# Patient Record
Sex: MALE | Race: WHITE | NOT HISPANIC OR LATINO
[De-identification: names, ages, dates, MRNs, and addresses within clinical notes are randomized per-mention and may not be internally consistent; named-entity substitution may affect disease eponyms.]

---

## 2020-08-11 ENCOUNTER — TRANSCRIBE ORDERS (OUTPATIENT)
Dept: SCHEDULING | Age: 80
End: 2020-08-11

## 2020-08-11 DIAGNOSIS — R97.0 ELEVATED CARCINOEMBRYONIC ANTIGEN (CEA): Primary | ICD-10-CM

## 2020-08-11 DIAGNOSIS — N40.1 BENIGN PROSTATIC HYPERPLASIA WITH LOWER URINARY TRACT SYMPTOMS: ICD-10-CM

## 2020-08-18 ENCOUNTER — HOSPITAL ENCOUNTER (OUTPATIENT)
Dept: RADIOLOGY | Age: 80
Discharge: HOME | End: 2020-08-18
Attending: UROLOGY
Payer: COMMERCIAL

## 2020-08-18 DIAGNOSIS — R97.0 ELEVATED CARCINOEMBRYONIC ANTIGEN (CEA): ICD-10-CM

## 2020-08-18 DIAGNOSIS — N40.1 BENIGN PROSTATIC HYPERPLASIA WITH LOWER URINARY TRACT SYMPTOMS: ICD-10-CM

## 2020-08-18 RX ORDER — GADOBUTROL 604.72 MG/ML
9.5 INJECTION INTRAVENOUS
Status: COMPLETED | OUTPATIENT
Start: 2020-08-18 | End: 2020-08-18

## 2020-08-18 RX ADMIN — GADOBUTROL 9.5 MMOL: 604.72 INJECTION INTRAVENOUS at 13:08

## 2021-11-15 ENCOUNTER — OFFICE VISIT (OUTPATIENT)
Dept: CARDIOLOGY CLINIC | Facility: CLINIC | Age: 81
End: 2021-11-15
Payer: MEDICARE

## 2021-11-15 VITALS
OXYGEN SATURATION: 96 % | BODY MASS INDEX: 29.66 KG/M2 | SYSTOLIC BLOOD PRESSURE: 142 MMHG | HEART RATE: 76 BPM | HEIGHT: 72 IN | WEIGHT: 219 LBS | DIASTOLIC BLOOD PRESSURE: 72 MMHG | RESPIRATION RATE: 16 BRPM

## 2021-11-15 DIAGNOSIS — I25.10 CORONARY ARTERY DISEASE INVOLVING NATIVE HEART WITHOUT ANGINA PECTORIS, UNSPECIFIED VESSEL OR LESION TYPE: Primary | ICD-10-CM

## 2021-11-15 PROCEDURE — 93000 ELECTROCARDIOGRAM COMPLETE: CPT | Performed by: INTERNAL MEDICINE

## 2021-11-15 PROCEDURE — 99204 OFFICE O/P NEW MOD 45 MIN: CPT | Performed by: INTERNAL MEDICINE

## 2021-11-15 RX ORDER — ALBUTEROL SULFATE 90 UG/1
AEROSOL, METERED RESPIRATORY (INHALATION)
COMMUNITY
Start: 2021-10-28

## 2021-11-15 RX ORDER — MONTELUKAST SODIUM 10 MG/1
10 TABLET ORAL DAILY
COMMUNITY
Start: 2021-10-26

## 2021-11-15 RX ORDER — VITAMIN E (DL,TOCOPHERYL ACET) 400 UNIT
TABLET,CHEWABLE ORAL
COMMUNITY

## 2021-11-15 RX ORDER — FEXOFENADINE HYDROCHLORIDE 60 MG/1
60 TABLET, FILM COATED ORAL AS NEEDED
COMMUNITY

## 2021-11-15 RX ORDER — RAMIPRIL 10 MG/1
10 CAPSULE ORAL DAILY
COMMUNITY
Start: 2021-10-26

## 2021-11-15 RX ORDER — OMEPRAZOLE MAGNESIUM 10 MG/1
20 GRANULE, DELAYED RELEASE ORAL DAILY
COMMUNITY

## 2021-11-15 RX ORDER — ROSUVASTATIN CALCIUM 40 MG/1
20 TABLET, COATED ORAL DAILY
COMMUNITY

## 2021-11-15 RX ORDER — ALBUTEROL SULFATE 90 UG/1
1 AEROSOL, METERED RESPIRATORY (INHALATION)
COMMUNITY
Start: 2021-05-26

## 2021-11-15 RX ORDER — ASPIRIN 81 MG/1
TABLET ORAL
COMMUNITY

## 2021-11-15 RX ORDER — PREDNISONE 20 MG/1
TABLET ORAL
COMMUNITY
Start: 2021-05-26

## 2021-11-15 RX ORDER — CHLORAL HYDRATE 500 MG
1000 CAPSULE ORAL DAILY
COMMUNITY

## 2021-11-15 RX ORDER — GABAPENTIN 300 MG/1
300 CAPSULE ORAL AS NEEDED
COMMUNITY
Start: 2021-10-18

## 2023-11-22 ENCOUNTER — OFFICE VISIT (OUTPATIENT)
Dept: CARDIOLOGY CLINIC | Facility: CLINIC | Age: 83
End: 2023-11-22
Payer: MEDICARE

## 2023-11-22 VITALS
BODY MASS INDEX: 29.12 KG/M2 | HEIGHT: 72 IN | SYSTOLIC BLOOD PRESSURE: 150 MMHG | WEIGHT: 215 LBS | HEART RATE: 72 BPM | DIASTOLIC BLOOD PRESSURE: 76 MMHG | OXYGEN SATURATION: 97 % | RESPIRATION RATE: 16 BRPM

## 2023-11-22 DIAGNOSIS — I25.10 CORONARY ARTERY DISEASE INVOLVING NATIVE CORONARY ARTERY OF NATIVE HEART WITHOUT ANGINA PECTORIS: Primary | ICD-10-CM

## 2023-11-22 DIAGNOSIS — I65.21 STENOSIS OF RIGHT CAROTID ARTERY: ICD-10-CM

## 2023-11-22 PROCEDURE — 99213 OFFICE O/P EST LOW 20 MIN: CPT | Performed by: INTERNAL MEDICINE

## 2023-11-22 RX ORDER — GABAPENTIN 100 MG/1
CAPSULE ORAL
COMMUNITY
Start: 2023-10-25

## 2023-11-22 RX ORDER — CELECOXIB 200 MG/1
200 CAPSULE ORAL DAILY
COMMUNITY
Start: 2023-06-06

## 2023-11-22 RX ORDER — FLUTICASONE PROPIONATE AND SALMETEROL XINAFOATE 230; 21 UG/1; UG/1
AEROSOL, METERED RESPIRATORY (INHALATION)
COMMUNITY
Start: 2023-10-25

## 2023-11-22 RX ORDER — FAMOTIDINE 20 MG/1
1 TABLET, FILM COATED ORAL EVERY MORNING
COMMUNITY
Start: 2023-09-13

## 2023-11-22 NOTE — PROGRESS NOTES
Cardiology Follow Up    Anuradha Rudd  1940  [id number]  [facility] [address]  [place] [id number] [phone]    1. Coronary artery disease involving native coronary artery of native heart without angina pectoris    2. Stenosis of right carotid artery          Interval History: 55-year-old retired  who has known carotid plaque with maximal stenosis of 50 to 69% in the right carotid artery and less than 50% in the left carotid artery. He is physically active and has not had exertional related discomfort. He has had no symptoms related to the central nervous system. He does not smoke cigarettes. He is on Crestor 40 mg daily and has an LDL cholesterol 53. He is able to walk 2 to 3 miles. Patient Active Problem List   Diagnosis    Coronary artery disease involving native coronary artery of native heart without angina pectoris    Stenosis of right carotid artery     History reviewed. No pertinent past medical history.   Social History     Socioeconomic History    Marital status: /Civil Union     Spouse name: Not on file    Number of children: Not on file    Years of education: Not on file    Highest education level: Not on file   Occupational History    Not on file   Tobacco Use    Smoking status: Never    Smokeless tobacco: Never   Vaping Use    Vaping Use: Never used   Substance and Sexual Activity    Alcohol use: Not Currently    Drug use: Never    Sexual activity: Not on file   Other Topics Concern    Not on file   Social History Narrative    Not on file     Social Determinants of Health     Financial Resource Strain: Not on file   Food Insecurity: Not on file   Transportation Needs: Not on file   Physical Activity: Not on file   Stress: Not on file   Social Connections: Not on file   Intimate Partner Violence: Not on file   Housing Stability: Not on file      Family History Problem Relation Age of Onset    Heart disease Father     Heart failure Father     Hypertension Father      History reviewed. No pertinent surgical history. Current Outpatient Medications:     Advair -21 MCG/ACT inhaler, , Disp: , Rfl:     albuterol (ProAir HFA) 90 mcg/act inhaler, Inhale 1 puff, Disp: , Rfl:     albuterol (PROVENTIL HFA,VENTOLIN HFA) 90 mcg/act inhaler, , Disp: , Rfl:     aspirin (ECOTRIN LOW STRENGTH) 81 mg EC tablet, tablet,delayed release (DR/EC): 81 mg 1 once a day, Disp: , Rfl:     celecoxib (CeleBREX) 200 mg capsule, Take 200 mg by mouth daily, Disp: , Rfl:     Cholecalciferol 25 MCG (1000 UT) capsule, Take 1,000 Units by mouth daily, Disp: , Rfl:     famotidine (PEPCID) 20 mg tablet, Take 1 tablet by mouth every morning, Disp: , Rfl:     fexofenadine (ALLEGRA) 60 MG tablet, Take 60 mg by mouth as needed  , Disp: , Rfl:     gabapentin (NEURONTIN) 100 mg capsule, , Disp: , Rfl:     gabapentin (NEURONTIN) 300 mg capsule, Take 300 mg by mouth as needed  , Disp: , Rfl:     montelukast (SINGULAIR) 10 mg tablet, Take 10 mg by mouth daily  , Disp: , Rfl:     Omega-3 Fatty Acids (fish oil) 1,000 mg, Take 1,000 mg by mouth daily, Disp: , Rfl:     Omeprazole Magnesium (PriLOSEC) 10 MG PACK, Take 20 mg by mouth daily  , Disp: , Rfl:     predniSONE 20 mg tablet, TAKE 1 TABLET BY MOUTH TWICE A DAY FOR 2 DAYS THEN TAKE 1 TABLET BY MOUTH EVERY DAY FOR 5 DAYS RESCUE GENNY, Disp: , Rfl:     ramipril (ALTACE) 10 MG capsule, Take 10 mg by mouth daily  , Disp: , Rfl:     rosuvastatin (CRESTOR) 40 MG tablet, Take 20 mg by mouth daily, Disp: , Rfl:     Vitamin E 400 units CHEW, Chew, Disp: , Rfl:   Allergies   Allergen Reactions    Bee Pollen Swelling       Labs:  No visits with results within 2 Month(s) from this visit. Latest known visit with results is:   No results found for any previous visit. Imaging: No results found. Review of Systems:  Review of Systems    Physical Exam:  150/76.   Heart rate 72 and regular. No carotid bruits. Regular rhythm. No murmurs or gallops. Lungs clear. No edema. Discussion/Summary:    1. Coronary artery disease equivalent without angina pectoris  2. Asymptomatic carotid artery disease    Recommendations:    1. Continue medications including aspirin  2. If severe discomfort in the front of the chest or in the back between the jaw and abdomen seek prompt medical attention  3.   Return in 1 year for follow-up      Steffi Joseph MD

## 2024-05-22 ENCOUNTER — HOSPITAL ENCOUNTER (INPATIENT)
Facility: HOSPITAL | Age: 84
LOS: 2 days | Discharge: HOME | DRG: 690 | End: 2024-05-24
Attending: EMERGENCY MEDICINE | Admitting: FAMILY MEDICINE
Payer: MEDICARE

## 2024-05-22 ENCOUNTER — APPOINTMENT (EMERGENCY)
Dept: CT IMAGING | Facility: HOSPITAL | Age: 84
DRG: 690 | End: 2024-05-22
Payer: MEDICARE

## 2024-05-22 DIAGNOSIS — R93.429 ABNORMAL CT SCAN, KIDNEY: ICD-10-CM

## 2024-05-22 DIAGNOSIS — R31.9 HEMATURIA, UNSPECIFIED TYPE: Primary | ICD-10-CM

## 2024-05-22 DIAGNOSIS — R93.41 ABNORMAL CT SCAN, BLADDER: ICD-10-CM

## 2024-05-22 DIAGNOSIS — R31.9 HEMATURIA: ICD-10-CM

## 2024-05-22 PROBLEM — R97.20 ELEVATED PROSTATE SPECIFIC ANTIGEN (PSA): Status: ACTIVE | Noted: 2023-12-13

## 2024-05-22 PROBLEM — E78.2 MIXED HYPERLIPIDEMIA: Status: ACTIVE | Noted: 2024-05-22

## 2024-05-22 PROBLEM — J44.9 COPD, GROUP B, BY GOLD 2017 CLASSIFICATION (HCC): Chronic | Status: ACTIVE | Noted: 2023-12-11

## 2024-05-22 PROBLEM — R31.0 GROSS HEMATURIA: Status: ACTIVE | Noted: 2024-05-22

## 2024-05-22 PROBLEM — N30.01 ACUTE CYSTITIS WITH HEMATURIA: Status: ACTIVE | Noted: 2024-05-22

## 2024-05-22 PROBLEM — K21.9 GERD (GASTROESOPHAGEAL REFLUX DISEASE): Status: ACTIVE | Noted: 2024-05-22

## 2024-05-22 LAB
ALBUMIN SERPL BCP-MCNC: 3.8 G/DL (ref 3.5–5)
ALP SERPL-CCNC: 44 U/L (ref 34–104)
ALT SERPL W P-5'-P-CCNC: 13 U/L (ref 7–52)
ANION GAP SERPL CALCULATED.3IONS-SCNC: 6 MMOL/L (ref 4–13)
ANION GAP SERPL CALCULATED.3IONS-SCNC: 7 MMOL/L (ref 4–13)
APTT PPP: 35 SECONDS (ref 23–37)
AST SERPL W P-5'-P-CCNC: 20 U/L (ref 13–39)
BACTERIA UR QL AUTO: ABNORMAL /HPF
BACTERIA UR QL AUTO: NORMAL /HPF
BASOPHILS # BLD AUTO: 0.03 THOUSANDS/ÂΜL (ref 0–0.1)
BASOPHILS NFR BLD AUTO: 0 % (ref 0–1)
BILIRUB SERPL-MCNC: 0.48 MG/DL (ref 0.2–1)
BILIRUB UR QL STRIP: ABNORMAL
BILIRUB UR QL STRIP: NEGATIVE
BUN SERPL-MCNC: 17 MG/DL (ref 5–25)
BUN SERPL-MCNC: 17 MG/DL (ref 5–25)
CALCIUM SERPL-MCNC: 8.7 MG/DL (ref 8.4–10.2)
CALCIUM SERPL-MCNC: 8.9 MG/DL (ref 8.4–10.2)
CHLORIDE SERPL-SCNC: 106 MMOL/L (ref 96–108)
CHLORIDE SERPL-SCNC: 107 MMOL/L (ref 96–108)
CK SERPL-CCNC: 81 U/L (ref 39–308)
CLARITY UR: ABNORMAL
CLARITY UR: ABNORMAL
CO2 SERPL-SCNC: 25 MMOL/L (ref 21–32)
CO2 SERPL-SCNC: 25 MMOL/L (ref 21–32)
COLOR UR: ABNORMAL
COLOR UR: ABNORMAL
CREAT SERPL-MCNC: 1.1 MG/DL (ref 0.6–1.3)
CREAT SERPL-MCNC: 1.12 MG/DL (ref 0.6–1.3)
EOSINOPHIL # BLD AUTO: 0.4 THOUSAND/ÂΜL (ref 0–0.61)
EOSINOPHIL NFR BLD AUTO: 6 % (ref 0–6)
ERYTHROCYTE [DISTWIDTH] IN BLOOD BY AUTOMATED COUNT: 12 % (ref 11.6–15.1)
ERYTHROCYTE [DISTWIDTH] IN BLOOD BY AUTOMATED COUNT: 12.1 % (ref 11.6–15.1)
GFR SERPL CREATININE-BSD FRML MDRD: 60 ML/MIN/1.73SQ M
GFR SERPL CREATININE-BSD FRML MDRD: 61 ML/MIN/1.73SQ M
GLUCOSE SERPL-MCNC: 100 MG/DL (ref 65–140)
GLUCOSE SERPL-MCNC: 107 MG/DL (ref 65–140)
GLUCOSE UR STRIP-MCNC: NEGATIVE MG/DL
GLUCOSE UR STRIP-MCNC: NEGATIVE MG/DL
HCT VFR BLD AUTO: 36.5 % (ref 36.5–49.3)
HCT VFR BLD AUTO: 38.4 % (ref 36.5–49.3)
HCT VFR BLD AUTO: 38.7 % (ref 36.5–49.3)
HGB BLD-MCNC: 12.4 G/DL (ref 12–17)
HGB BLD-MCNC: 13 G/DL (ref 12–17)
HGB BLD-MCNC: 13.2 G/DL (ref 12–17)
HGB UR QL STRIP.AUTO: ABNORMAL
HGB UR QL STRIP.AUTO: ABNORMAL
IMM GRANULOCYTES # BLD AUTO: 0.02 THOUSAND/UL (ref 0–0.2)
IMM GRANULOCYTES NFR BLD AUTO: 0 % (ref 0–2)
INR PPP: 1.09 (ref 0.84–1.19)
KETONES UR STRIP-MCNC: ABNORMAL MG/DL
KETONES UR STRIP-MCNC: NEGATIVE MG/DL
LEUKOCYTE ESTERASE UR QL STRIP: ABNORMAL
LEUKOCYTE ESTERASE UR QL STRIP: NEGATIVE
LYMPHOCYTES # BLD AUTO: 1.14 THOUSANDS/ÂΜL (ref 0.6–4.47)
LYMPHOCYTES NFR BLD AUTO: 16 % (ref 14–44)
MCH RBC QN AUTO: 32.1 PG (ref 26.8–34.3)
MCH RBC QN AUTO: 32.4 PG (ref 26.8–34.3)
MCHC RBC AUTO-ENTMCNC: 33.6 G/DL (ref 31.4–37.4)
MCHC RBC AUTO-ENTMCNC: 34.4 G/DL (ref 31.4–37.4)
MCV RBC AUTO: 94 FL (ref 82–98)
MCV RBC AUTO: 96 FL (ref 82–98)
MONOCYTES # BLD AUTO: 0.87 THOUSAND/ÂΜL (ref 0.17–1.22)
MONOCYTES NFR BLD AUTO: 12 % (ref 4–12)
NEUTROPHILS # BLD AUTO: 4.87 THOUSANDS/ÂΜL (ref 1.85–7.62)
NEUTS SEG NFR BLD AUTO: 66 % (ref 43–75)
NITRITE UR QL STRIP: NEGATIVE
NITRITE UR QL STRIP: POSITIVE
NON-SQ EPI CELLS URNS QL MICRO: ABNORMAL /HPF
NON-SQ EPI CELLS URNS QL MICRO: NORMAL /HPF
NRBC BLD AUTO-RTO: 0 /100 WBCS
PH UR STRIP.AUTO: 7 [PH]
PH UR STRIP.AUTO: 7.5 [PH]
PLATELET # BLD AUTO: 207 THOUSANDS/UL (ref 149–390)
PLATELET # BLD AUTO: 209 THOUSANDS/UL (ref 149–390)
PMV BLD AUTO: 8.9 FL (ref 8.9–12.7)
PMV BLD AUTO: 9.1 FL (ref 8.9–12.7)
POTASSIUM SERPL-SCNC: 4.2 MMOL/L (ref 3.5–5.3)
POTASSIUM SERPL-SCNC: 4.3 MMOL/L (ref 3.5–5.3)
PROT SERPL-MCNC: 6.6 G/DL (ref 6.4–8.4)
PROT UR STRIP-MCNC: >=300 MG/DL
PROT UR STRIP-MCNC: ABNORMAL MG/DL
PROTHROMBIN TIME: 14.7 SECONDS (ref 11.6–14.5)
PSA SERPL-MCNC: 10.84 NG/ML (ref 0–4)
RBC # BLD AUTO: 4.05 MILLION/UL (ref 3.88–5.62)
RBC # BLD AUTO: 4.07 MILLION/UL (ref 3.88–5.62)
RBC #/AREA URNS AUTO: ABNORMAL /HPF
RBC #/AREA URNS AUTO: NORMAL /HPF
SODIUM SERPL-SCNC: 138 MMOL/L (ref 135–147)
SODIUM SERPL-SCNC: 138 MMOL/L (ref 135–147)
SP GR UR STRIP.AUTO: 1.01 (ref 1–1.03)
SP GR UR STRIP.AUTO: 1.01 (ref 1–1.03)
UROBILINOGEN UR QL STRIP.AUTO: >=8 E.U./DL
UROBILINOGEN UR STRIP-ACNC: <2 MG/DL
WBC # BLD AUTO: 11.86 THOUSAND/UL (ref 4.31–10.16)
WBC # BLD AUTO: 7.33 THOUSAND/UL (ref 4.31–10.16)
WBC #/AREA URNS AUTO: ABNORMAL /HPF
WBC #/AREA URNS AUTO: NORMAL /HPF

## 2024-05-22 PROCEDURE — 85018 HEMOGLOBIN: CPT | Performed by: INTERNAL MEDICINE

## 2024-05-22 PROCEDURE — 81001 URINALYSIS AUTO W/SCOPE: CPT | Performed by: EMERGENCY MEDICINE

## 2024-05-22 PROCEDURE — 99223 1ST HOSP IP/OBS HIGH 75: CPT | Performed by: FAMILY MEDICINE

## 2024-05-22 PROCEDURE — 99222 1ST HOSP IP/OBS MODERATE 55: CPT | Performed by: UROLOGY

## 2024-05-22 PROCEDURE — 36415 COLL VENOUS BLD VENIPUNCTURE: CPT | Performed by: EMERGENCY MEDICINE

## 2024-05-22 PROCEDURE — 82550 ASSAY OF CK (CPK): CPT | Performed by: EMERGENCY MEDICINE

## 2024-05-22 PROCEDURE — 85730 THROMBOPLASTIN TIME PARTIAL: CPT | Performed by: EMERGENCY MEDICINE

## 2024-05-22 PROCEDURE — 87040 BLOOD CULTURE FOR BACTERIA: CPT | Performed by: FAMILY MEDICINE

## 2024-05-22 PROCEDURE — 99284 EMERGENCY DEPT VISIT MOD MDM: CPT

## 2024-05-22 PROCEDURE — 99285 EMERGENCY DEPT VISIT HI MDM: CPT | Performed by: EMERGENCY MEDICINE

## 2024-05-22 PROCEDURE — 85025 COMPLETE CBC W/AUTO DIFF WBC: CPT | Performed by: EMERGENCY MEDICINE

## 2024-05-22 PROCEDURE — 74176 CT ABD & PELVIS W/O CONTRAST: CPT

## 2024-05-22 PROCEDURE — 87086 URINE CULTURE/COLONY COUNT: CPT | Performed by: FAMILY MEDICINE

## 2024-05-22 PROCEDURE — G0103 PSA SCREENING: HCPCS | Performed by: INTERNAL MEDICINE

## 2024-05-22 PROCEDURE — 80048 BASIC METABOLIC PNL TOTAL CA: CPT | Performed by: INTERNAL MEDICINE

## 2024-05-22 PROCEDURE — 85027 COMPLETE CBC AUTOMATED: CPT | Performed by: INTERNAL MEDICINE

## 2024-05-22 PROCEDURE — 85610 PROTHROMBIN TIME: CPT | Performed by: EMERGENCY MEDICINE

## 2024-05-22 PROCEDURE — 85014 HEMATOCRIT: CPT | Performed by: INTERNAL MEDICINE

## 2024-05-22 PROCEDURE — 80053 COMPREHEN METABOLIC PANEL: CPT | Performed by: EMERGENCY MEDICINE

## 2024-05-22 RX ORDER — MONTELUKAST SODIUM 10 MG/1
10 TABLET ORAL DAILY
Status: DISCONTINUED | OUTPATIENT
Start: 2024-05-22 | End: 2024-05-24 | Stop reason: HOSPADM

## 2024-05-22 RX ORDER — CELECOXIB 100 MG/1
200 CAPSULE ORAL DAILY
Status: DISCONTINUED | OUTPATIENT
Start: 2024-05-22 | End: 2024-05-24

## 2024-05-22 RX ORDER — PANTOPRAZOLE SODIUM 20 MG/1
20 TABLET, DELAYED RELEASE ORAL
Status: DISCONTINUED | OUTPATIENT
Start: 2024-05-22 | End: 2024-05-24 | Stop reason: HOSPADM

## 2024-05-22 RX ORDER — FINASTERIDE 5 MG/1
5 TABLET, FILM COATED ORAL DAILY
Status: DISCONTINUED | OUTPATIENT
Start: 2024-05-22 | End: 2024-05-24 | Stop reason: HOSPADM

## 2024-05-22 RX ORDER — ALBUTEROL SULFATE 90 UG/1
2 AEROSOL, METERED RESPIRATORY (INHALATION) EVERY 4 HOURS PRN
Status: DISCONTINUED | OUTPATIENT
Start: 2024-05-22 | End: 2024-05-24 | Stop reason: HOSPADM

## 2024-05-22 RX ORDER — LIDOCAINE HYDROCHLORIDE 20 MG/ML
1 JELLY TOPICAL ONCE
Status: COMPLETED | OUTPATIENT
Start: 2024-05-22 | End: 2024-05-22

## 2024-05-22 RX ORDER — ATORVASTATIN CALCIUM 40 MG/1
40 TABLET, FILM COATED ORAL
Status: DISCONTINUED | OUTPATIENT
Start: 2024-05-22 | End: 2024-05-24 | Stop reason: HOSPADM

## 2024-05-22 RX ORDER — BUDESONIDE AND FORMOTEROL FUMARATE DIHYDRATE 160; 4.5 UG/1; UG/1
2 AEROSOL RESPIRATORY (INHALATION) 2 TIMES DAILY
Status: DISCONTINUED | OUTPATIENT
Start: 2024-05-22 | End: 2024-05-24 | Stop reason: HOSPADM

## 2024-05-22 RX ORDER — SODIUM CHLORIDE, SODIUM GLUCONATE, SODIUM ACETATE, POTASSIUM CHLORIDE, MAGNESIUM CHLORIDE, SODIUM PHOSPHATE, DIBASIC, AND POTASSIUM PHOSPHATE .53; .5; .37; .037; .03; .012; .00082 G/100ML; G/100ML; G/100ML; G/100ML; G/100ML; G/100ML; G/100ML
75 INJECTION, SOLUTION INTRAVENOUS CONTINUOUS
Status: DISCONTINUED | OUTPATIENT
Start: 2024-05-22 | End: 2024-05-24 | Stop reason: HOSPADM

## 2024-05-22 RX ORDER — FAMOTIDINE 20 MG/1
20 TABLET, FILM COATED ORAL EVERY MORNING
Status: DISCONTINUED | OUTPATIENT
Start: 2024-05-22 | End: 2024-05-24 | Stop reason: HOSPADM

## 2024-05-22 RX ORDER — ACETAMINOPHEN 325 MG/1
650 TABLET ORAL EVERY 6 HOURS PRN
Status: DISCONTINUED | OUTPATIENT
Start: 2024-05-22 | End: 2024-05-24 | Stop reason: HOSPADM

## 2024-05-22 RX ORDER — LISINOPRIL 10 MG/1
10 TABLET ORAL DAILY
Status: DISCONTINUED | OUTPATIENT
Start: 2024-05-22 | End: 2024-05-24 | Stop reason: HOSPADM

## 2024-05-22 RX ADMIN — CELECOXIB 200 MG: 100 CAPSULE ORAL at 09:16

## 2024-05-22 RX ADMIN — ACETAMINOPHEN 650 MG: 325 TABLET, FILM COATED ORAL at 09:25

## 2024-05-22 RX ADMIN — PANTOPRAZOLE SODIUM 20 MG: 20 TABLET, DELAYED RELEASE ORAL at 06:28

## 2024-05-22 RX ADMIN — FAMOTIDINE 20 MG: 20 TABLET, FILM COATED ORAL at 09:16

## 2024-05-22 RX ADMIN — MONTELUKAST 10 MG: 10 TABLET, FILM COATED ORAL at 09:16

## 2024-05-22 RX ADMIN — ATORVASTATIN CALCIUM 40 MG: 40 TABLET, FILM COATED ORAL at 16:25

## 2024-05-22 RX ADMIN — FINASTERIDE 5 MG: 5 TABLET, FILM COATED ORAL at 14:56

## 2024-05-22 RX ADMIN — Medication 1000 UNITS: at 09:16

## 2024-05-22 RX ADMIN — CEFTRIAXONE SODIUM 1000 MG: 10 INJECTION, POWDER, FOR SOLUTION INTRAVENOUS at 05:34

## 2024-05-22 RX ADMIN — LISINOPRIL 10 MG: 10 TABLET ORAL at 09:16

## 2024-05-22 RX ADMIN — LIDOCAINE HYDROCHLORIDE 1 APPLICATION: 20 JELLY TOPICAL at 05:03

## 2024-05-22 RX ADMIN — BUDESONIDE AND FORMOTEROL FUMARATE DIHYDRATE 2 PUFF: 160; 4.5 AEROSOL RESPIRATORY (INHALATION) at 19:01

## 2024-05-22 RX ADMIN — SODIUM CHLORIDE, SODIUM GLUCONATE, SODIUM ACETATE, POTASSIUM CHLORIDE, MAGNESIUM CHLORIDE, SODIUM PHOSPHATE, DIBASIC, AND POTASSIUM PHOSPHATE 75 ML/HR: .53; .5; .37; .037; .03; .012; .00082 INJECTION, SOLUTION INTRAVENOUS at 06:26

## 2024-05-22 NOTE — PLAN OF CARE
Problem: PAIN - ADULT  Goal: Verbalizes/displays adequate comfort level or baseline comfort level  Description: Interventions:  - Encourage patient to monitor pain and request assistance  - Assess pain using appropriate pain scale  - Administer analgesics based on type and severity of pain and evaluate response  - Implement non-pharmacological measures as appropriate and evaluate response  - Consider cultural and social influences on pain and pain management  - Notify physician/advanced practitioner if interventions unsuccessful or patient reports new pain  Outcome: Progressing     Problem: INFECTION - ADULT  Goal: Absence or prevention of progression during hospitalization  Description: INTERVENTIONS:  - Assess and monitor for signs and symptoms of infection  - Monitor lab/diagnostic results  - Monitor all insertion sites, i.e. indwelling lines, tubes, and drains  - Monitor endotracheal if appropriate and nasal secretions for changes in amount and color  - Jenner appropriate cooling/warming therapies per order  - Administer medications as ordered  - Instruct and encourage patient and family to use good hand hygiene technique  - Identify and instruct in appropriate isolation precautions for identified infection/condition  Outcome: Progressing  Goal: Absence of fever/infection during neutropenic period  Description: INTERVENTIONS:  - Monitor WBC    Outcome: Progressing     Problem: SAFETY ADULT  Goal: Patient will remain free of falls  Description: INTERVENTIONS:  - Educate patient/family on patient safety including physical limitations  - Instruct patient to call for assistance with activity   - Consult OT/PT to assist with strengthening/mobility   - Keep Call bell within reach  - Keep bed low and locked with side rails adjusted as appropriate  - Keep care items and personal belongings within reach  - Initiate and maintain comfort rounds  - Make Fall Risk Sign visible to staff  - Offer Toileting every 2 Hours,  in advance of need  - Apply yellow socks and bracelet for high fall risk patients  - Consider moving patient to room near nurses station  Outcome: Progressing  Goal: Maintain or return to baseline ADL function  Description: INTERVENTIONS:  -  Assess patient's ability to carry out ADLs; assess patient's baseline for ADL function and identify physical deficits which impact ability to perform ADLs (bathing, care of mouth/teeth, toileting, grooming, dressing, etc.)  - Assess/evaluate cause of self-care deficits   - Assess range of motion  - Assess patient's mobility; develop plan if impaired  - Assess patient's need for assistive devices and provide as appropriate  - Encourage maximum independence but intervene and supervise when necessary  - Involve family in performance of ADLs  - Assess for home care needs following discharge   - Consider OT consult to assist with ADL evaluation and planning for discharge  - Provide patient education as appropriate  Outcome: Progressing  Goal: Maintains/Returns to pre admission functional level  Description: INTERVENTIONS:  - Perform AM-PAC 6 Click Basic Mobility/ Daily Activity assessment daily.  - Set and communicate daily mobility goal to care team and patient/family/caregiver.   - Collaborate with rehabilitation services on mobility goals if consulted  - Perform Range of Motion 2 times a day.  - Reposition patient every 2 hours.  - Dangle patient 2 times a day  - Stand patient 2 times a day  - Ambulate patient 2 times a day  - Out of bed to chair 2 times a day   - Out of bed for meals 2 times a day  - Out of bed for toileting  - Record patient progress and toleration of activity level   Outcome: Progressing     Problem: DISCHARGE PLANNING  Goal: Discharge to home or other facility with appropriate resources  Description: INTERVENTIONS:  - Identify barriers to discharge w/patient and caregiver  - Arrange for needed discharge resources and transportation as appropriate  - Identify  discharge learning needs (meds, wound care, etc.)  - Arrange for interpretive services to assist at discharge as needed  - Refer to Case Management Department for coordinating discharge planning if the patient needs post-hospital services based on physician/advanced practitioner order or complex needs related to functional status, cognitive ability, or social support system  Outcome: Progressing     Problem: Knowledge Deficit  Goal: Patient/family/caregiver demonstrates understanding of disease process, treatment plan, medications, and discharge instructions  Description: Complete learning assessment and assess knowledge base.  Interventions:  - Provide teaching at level of understanding  - Provide teaching via preferred learning methods  Outcome: Progressing

## 2024-05-22 NOTE — ASSESSMENT & PLAN NOTE
Painless hematuria  Consult urology  Manual irrigation    Hemoglobin was taken at 2 AM.  CBC, BMP and PSA at 10 AM    CT scan: Lobulated appearing hyperdense material within the posterior aspect of the lower bladder 5.8 x 2.3 cm.  This may represent a tumor, clot material or combination.

## 2024-05-22 NOTE — CONSULTS
Consult - Urology  Anthony Nelson 1940, 83 y.o. male MRN: [id number]    Unit/Bed#: -[facility] Encounter: [id number]  Assessment & Plan:  Hematuria  -CT showing lobulated appearing hyperdense material within the posterior aspect of lower urinary bladder measuring 5.8 x 2.3 cm.  This may represent tumor, or clot material.  -Prostate is enlarged.  Patient reports history of elevated PSA, he follows with primary urologist, has undergone prostate biopsy in the past with normal results according to patient  -Hemoglobin 13, stable  -WBC 11  -Vital signs stable, afebrile  -UA negative for infection  -Upsize catheter 24 [place]ovan manual irrigated 50 cc clot, started CBI.   Irrigation running clear at end.  -No need for OR at this time.  Run CBI fast pace, reassess tomorrow for clamping.  -N.p.o. as a precaution, if unable to wean or difficulties with CBI overnight will consider OR tomorrow for cystoscopy, clot evacuation, possible TURBT.    Subjective:   Anthony is an 83-year-old male past medical history COPD, HLD who presented to the ED with hematuria.  Patient reports hematuria started yesterday morning.  He reports urine was punch colored and noticed blood clots.  He is on a baby aspirin.  No anticoagulation.  He reports he follows with urologist in [place] for elevated PSA.  He has had multiple prostate biopsies in the past with normal results.  He reports he serially checks his PSA with his urologist.  In the ED he had a CT scan which showed lobulated appearing hyperdense material within the posterior aspect of lower urinary bladder measuring 5.8 x 2.3 cm.  This may represent tumor, or clot material.  He had a three-way catheter placed and was transferred to the floor.  Urology was consulted due to possible bladder mass.  Patient denies any previous episodes of hematuria.  He denies any previous smoking history.  No family history of  malignancy.  No occupational exposures.    Review of Systems   Constitutional:   Negative for chills and fever.   Respiratory:  Negative for cough and shortness of breath.    Cardiovascular:  Negative for chest pain and palpitations.   Gastrointestinal:  Negative for abdominal pain and vomiting.   Genitourinary:  Positive for hematuria. Negative for dysuria.   Musculoskeletal:  Negative for arthralgias and back pain.   Skin:  Negative for color change and rash.   Neurological:  Negative for seizures and syncope.   All other systems reviewed and are negative.      Objective:  Vitals: Blood pressure 157/81, pulse 76, temperature 97.8 °F (36.6 °C), resp. rate 18, height 6' (1.829 m), weight 97.6 kg (215 lb 2.7 oz), SpO2 94%.,Body mass index is 29.18 kg/m².    Physical Exam  Constitutional:       General: He is not in acute distress.     Appearance: Normal appearance. He is normal weight. He is not ill-appearing or toxic-appearing.   HENT:      Head: Normocephalic and atraumatic.      Right Ear: External ear normal.      Left Ear: External ear normal.      Nose: Nose normal.      Mouth/Throat:      Mouth: Mucous membranes are moist.   Eyes:      General: No scleral icterus.     Conjunctiva/sclera: Conjunctivae normal.   Cardiovascular:      Rate and Rhythm: Normal rate.      Pulses: Normal pulses.   Pulmonary:      Effort: Pulmonary effort is normal.   Abdominal:      General: Abdomen is flat.      Palpations: Abdomen is soft.   Musculoskeletal:         General: Normal range of motion.      Cervical back: Normal range of motion.   Skin:     General: Skin is warm.      Findings: No rash.   Neurological:      General: No focal deficit present.      Mental Status: He is alert and oriented to person, place, and time. Mental status is at baseline.   Psychiatric:         Mood and Affect: Mood normal.         Behavior: Behavior normal.         Thought Content: Thought content normal.         Judgment: Judgment normal.         Imaging:  CT ABDOMEN AND PELVIS WITHOUT IV CONTRAST     INDICATION: Painless  gross hematuria. On aspirin.     COMPARISON: Report of MRI pelvis from [facility] dated August 18, 2020.     TECHNIQUE: CT examination of the abdomen and pelvis was performed without intravenous contrast. Multiplanar 2D reformatted images were created from the source data.     This examination, like all CT scans performed in the [facility], was performed utilizing techniques to minimize radiation dose exposure, including the use of iterative reconstruction and automated exposure control. Radiation dose length   product (DLP) for this visit: 1139 mGy-cm     Enteric Contrast: Not administered.     FINDINGS:     ABDOMEN     LOWER CHEST: Subpleural coarsening suggests a degree of fibrosis. Pleural calcifications. Coronary artery disease.     LIVER/BILIARY TREE: Unremarkable.     GALLBLADDER: Cholelithiasis without findings of acute cholecystitis.     SPLEEN: Unremarkable.     PANCREAS: Unremarkable.     ADRENAL GLANDS: Unremarkable.     KIDNEYS/URETERS: There is a 1.2 cm indeterminate hypodense lesion laterally in the interpolar region right kidney (series 2 image 68, series 601 image 83); follow-up recommended. Tiny nonobstructing calculi versus renovascular calcifications bilaterally.   No hydronephrosis bilaterally.     STOMACH AND BOWEL: There is colonic diverticulosis without evidence of acute diverticulitis. No bowel obstruction.     APPENDIX: No findings to suggest appendicitis.     ABDOMINOPELVIC CAVITY: No ascites. No pneumoperitoneum. No lymphadenopathy.     VESSELS: Atherosclerosis. No abdominal aortic aneurysm.     PELVIS     REPRODUCTIVE ORGANS: The prostate is enlarged.     URINARY BLADDER: There is lobulated appearing hyperdense material within the posterior aspect of the lower urinary bladder measuring approximately 5.8 x 2.3 cm; this could represent tumor, clot material, or a combination. Follow-up recommended. A large   bladder diverticulum is present arising from the superior  aspect of the urinary bladder.     ABDOMINAL WALL/INGUINAL REGIONS: Small fat-containing umbilical hernia. Moderate right and small left fat-containing inguinal hernias.     BONES: No acute fracture or suspicious osseous lesion. There is multilevel degenerative change of the spine.        IMPRESSION:     There is lobulated appearing hyperdense material within the posterior aspect of the lower urinary bladder, measuring approximately 5.8 x 2.3 cm. This may represent tumor, clot material, or a combination. Follow-up is required. Urology consultation and   follow-up is recommended.     The prostate is enlarged. Clinical and laboratory (PSA) correlation recommended.     There is a 1.2 cm indeterminate hypodense lesion within the lateral aspect of the interpolar region right kidney. Follow-up is recommended. Nonemergent outpatient ultrasound is suggested as the initial step in evaluating this lesion. If this cannot be   proven to represent a simple cyst on ultrasound, renal protocol CT or MRI would be suggested for further characterization, if there are no contraindications.     Cholelithiasis. No CT evidence of acute cholecystitis.     Colonic diverticulosis without evidence of acute diverticulitis.     Atherosclerosis. Coronary artery disease.     Other nonemergent findings as above.     This examination demonstrates findings for which clinical and imaging follow-up is recommended and was logged as such in EPIC.        I personally discussed this study with REINA SANCHEZ on 5/22/2024 4:46 AM.           Workstation performed: [id number]    Labs:  Recent Labs     05/22/24  0200 05/22/24  0954   WBC 7.33 11.86*     Recent Labs     05/22/24  0200 05/22/24  0954   HGB 13.2 13.0       Recent Labs     05/22/24  0200 05/22/24  0954   CREATININE 1.12 1.10       History:  Social History     Socioeconomic History    Marital status: /Civil Union     Spouse name: None    Number of children: None    Years of education: None     Highest education level: None   Occupational History    None   Tobacco Use    Smoking status: Never    Smokeless tobacco: Never   Vaping Use    Vaping status: Never Used   Substance and Sexual Activity    Alcohol use: Not Currently    Drug use: Never    Sexual activity: None   Other Topics Concern    None   Social History Narrative    None     Social Determinants of Health     Financial Resource Strain: Not on file   Food Insecurity: No Food Insecurity (11/29/2023)    Received from [facility]    Hunger Vital Sign     Worried About Running Out of Food in the Last Year: Never true     Ran Out of Food in the Last Year: Never true   Transportation Needs: Not on file   Physical Activity: Not on file   Stress: Not on file   Social Connections: Not on file   Intimate Partner Violence: Not on file   Housing Stability: Not on file     History reviewed. No pertinent past medical history.  History reviewed. No pertinent surgical history.  Family History   Problem Relation Age of Onset    Heart disease Father     Heart failure Father     Hypertension Father        Marie Lopez PA-C  Date: 5/22/2024 Time: 11:53 AM

## 2024-05-22 NOTE — ASSESSMENT & PLAN NOTE
Patient with enlarged prostate, cannot find elevated PSA test in the EMR.  I will add that on for blood work

## 2024-05-22 NOTE — H&P
[facility]  H&P  Name: Anthony Nelson 83 y.o. male I MRN: [id number]  Unit/Bed#: ED 23 I Date of Admission: 5/22/2024   Date of Service: 5/22/2024 I Hospital Day: 0      Assessment & Plan   * Gross hematuria  Assessment & Plan  Painless hematuria  Consult urology  Manual irrigation    Hemoglobin was taken at 2 AM.  CBC, BMP and PSA at 10 AM    CT scan: Lobulated appearing hyperdense material within the posterior aspect of the lower bladder 5.8 x 2.3 cm.  This may represent a tumor, clot material or combination.    Acute cystitis with hematuria  Assessment & Plan  Rocephin 1 g IV daily    GERD (gastroesophageal reflux disease)  Assessment & Plan  Continue Pepcid and Protonix    Elevated prostate specific antigen (PSA)  Assessment & Plan  Patient with enlarged prostate, cannot find elevated PSA test in the EMR.  I will add that on for blood work    Mixed hyperlipidemia  Assessment & Plan  Continue statin    COPD, group B, by GOLD 2017 classification (HCC)  Assessment & Plan  Uses inhalers at home.       Disposition  #1 manual irrigation  #2 IV Rocephin  #3 consult urology  #4 n.p.o.  #5 CBC, BMP and PSA at 10 AM          VTE Prophylaxis: Pharmacologic VTE Prophylaxis contraindicated due to gross hematuria   / sequential compression device   Code Status: Level 1 - Full Code       Anticipated Length of Stay:  Patient will be admitted on an Inpatient basis with an anticipated length of stay of greater than 2 midnights.   Justification for Hospital Stay: Please see detailed plans noted above.    Chief Complaint:     Blood in the urine  History of Present Illness:  Anthony Nelson is a 83 y.o. male who has past medical history significant for COPD, hyperlipidemia, comes in for blood in the urine.  This started yesterday morning.  Patient states those urine did cleared up 11 but then around 7 PM it became acutely bloody.  Patient is on aspirin.  Denies no falls or trauma.  CT scan showing possible bladder  mass.      Review of Systems:    Constitutional:  Denies fever or chills   Eyes:  Denies change in visual acuity   HENT:  Denies nasal congestion or sore throat   Respiratory:  Denies cough or shortness of breath   Cardiovascular:  Denies chest pain or edema   GI:  Denies abdominal pain or bloody stools  : Gross hematuria  Musculoskeletal:  Denies back pain or joint pain   Integument:  Denies rash   Neurologic:  Denies headache or sensory changes   Endocrine:  Denies polyuria or polydipsia   Lymphatic:  Denies swollen glands   Psychiatric:  Denies depression or anxiety     Past Medical and Surgical History:   History reviewed. No pertinent past medical history.  History reviewed. No pertinent surgical history.    Meds/Allergies:  No current facility-administered medications on file prior to encounter.     Current Outpatient Medications on File Prior to Encounter   Medication Sig Dispense Refill    Advair -21 MCG/ACT inhaler       albuterol (ProAir HFA) 90 mcg/act inhaler Inhale 1 puff      albuterol (PROVENTIL HFA,VENTOLIN HFA) 90 mcg/act inhaler       aspirin (ECOTRIN LOW STRENGTH) 81 mg EC tablet tablet,delayed release (DR/EC): 81 mg 1 once a day      celecoxib (CeleBREX) 200 mg capsule Take 200 mg by mouth daily      Cholecalciferol 25 MCG (1000 UT) capsule Take 1,000 Units by mouth daily      famotidine (PEPCID) 20 mg tablet Take 1 tablet by mouth every morning      fexofenadine (ALLEGRA) 60 MG tablet Take 60 mg by mouth as needed        gabapentin (NEURONTIN) 100 mg capsule       gabapentin (NEURONTIN) 300 mg capsule Take 300 mg by mouth as needed        montelukast (SINGULAIR) 10 mg tablet Take 10 mg by mouth daily        Omega-3 Fatty Acids (fish oil) 1,000 mg Take 1,000 mg by mouth daily      Omeprazole Magnesium (PriLOSEC) 10 MG PACK Take 20 mg by mouth daily        predniSONE 20 mg tablet TAKE 1 TABLET BY MOUTH TWICE A DAY FOR 2 DAYS THEN TAKE 1 TABLET BY MOUTH EVERY DAY FOR 5 DAYS RESCUE GENNY       ramipril (ALTACE) 10 MG capsule Take 10 mg by mouth daily        rosuvastatin (CRESTOR) 40 MG tablet Take 20 mg by mouth daily      Vitamin E 400 units CHEW Chew             Allergies:   Allergies   Allergen Reactions    Bee Pollen Swelling       History:  Marital Status: /Civil Union     Substance Use History:   Social History     Substance and Sexual Activity   Alcohol Use Not Currently     Social History     Tobacco Use   Smoking Status Never   Smokeless Tobacco Never     Social History     Substance and Sexual Activity   Drug Use Never       Family History:  Family History   Problem Relation Age of Onset    Heart disease Father     Heart failure Father     Hypertension Father        Physical Exam:     Vitals:   Blood Pressure: (!) 189/88 (05/22/24 0053)  Pulse: 74 (05/22/24 0053)  Temperature: 98.1 °F (36.7 °C) (05/22/24 0053)  Temp Source: Oral (05/22/24 0053)  Respirations: 18 (05/22/24 0053)  SpO2: 98 % (05/22/24 0054)    Constitutional:  Non-toxic appearance  Eyes:  EOMI, No scleral icterus   HENT:   oropharynx moist, external ears normal, external nose normal   Respiratory:  No respiratory distress, no wheezing   Cardiovascular:  Normal rate, no murmurs   GI:  Soft, nondistended, no guarding   : Roman catheter in place, gross hematuria seen  Musculoskeletal:  no tenderness, no deformities. Back- no tenderness  Integument:  no jaundice, no rash   Neurologic:  Alert &awake, communicative, CN 2-12 normal,  no focal deficits noted   Psychiatric:  Speech and behavior appropriate       Lab Results: I have personally reviewed pertinent reports.   and I have personally reviewed pertinent films in PACS    Results from last 7 days   Lab Units 05/22/24  0200   WBC Thousand/uL 7.33   HEMOGLOBIN g/dL 13.2   HEMATOCRIT % 38.4   PLATELETS Thousands/uL 209   SEGS PCT % 66   LYMPHO PCT % 16   MONO PCT % 12   EOS PCT % 6     Results from last 7 days   Lab Units 05/22/24  0200   POTASSIUM mmol/L 4.2   CHLORIDE mmol/L  107   CO2 mmol/L 25   BUN mg/dL 17   CREATININE mg/dL 1.12   CALCIUM mg/dL 8.7   ALK PHOS U/L 44   ALT U/L 13   AST U/L 20     Results from last 7 days   Lab Units 05/22/24  0200   INR  1.09         Imaging: I have personally reviewed pertinent reports.   and I have personally reviewed pertinent films in PACS    CT abdomen pelvis wo contrast    Result Date: 5/22/2024  Narrative: CT ABDOMEN AND PELVIS WITHOUT IV CONTRAST INDICATION: Painless gross hematuria. On aspirin. COMPARISON: Report of MRI pelvis from [facility] dated August 18, 2020. TECHNIQUE: CT examination of the abdomen and pelvis was performed without intravenous contrast. Multiplanar 2D reformatted images were created from the source data. This examination, like all CT scans performed in the [facility], was performed utilizing techniques to minimize radiation dose exposure, including the use of iterative reconstruction and automated exposure control. Radiation dose length product (DLP) for this visit: 1139 mGy-cm Enteric Contrast: Not administered. FINDINGS: ABDOMEN LOWER CHEST: Subpleural coarsening suggests a degree of fibrosis. Pleural calcifications. Coronary artery disease. LIVER/BILIARY TREE: Unremarkable. GALLBLADDER: Cholelithiasis without findings of acute cholecystitis. SPLEEN: Unremarkable. PANCREAS: Unremarkable. ADRENAL GLANDS: Unremarkable. KIDNEYS/URETERS: There is a 1.2 cm indeterminate hypodense lesion laterally in the interpolar region right kidney (series 2 image 68, series 601 image 83); follow-up recommended. Tiny nonobstructing calculi versus renovascular calcifications bilaterally.  No hydronephrosis bilaterally. STOMACH AND BOWEL: There is colonic diverticulosis without evidence of acute diverticulitis. No bowel obstruction. APPENDIX: No findings to suggest appendicitis. ABDOMINOPELVIC CAVITY: No ascites. No pneumoperitoneum. No lymphadenopathy. VESSELS: Atherosclerosis. No abdominal aortic aneurysm. PELVIS  REPRODUCTIVE ORGANS: The prostate is enlarged. URINARY BLADDER: There is lobulated appearing hyperdense material within the posterior aspect of the lower urinary bladder measuring approximately 5.8 x 2.3 cm; this could represent tumor, clot material, or a combination. Follow-up recommended. A large bladder diverticulum is present arising from the superior aspect of the urinary bladder. ABDOMINAL WALL/INGUINAL REGIONS: Small fat-containing umbilical hernia. Moderate right and small left fat-containing inguinal hernias. BONES: No acute fracture or suspicious osseous lesion. There is multilevel degenerative change of the spine.     Impression: There is lobulated appearing hyperdense material within the posterior aspect of the lower urinary bladder, measuring approximately 5.8 x 2.3 cm. This may represent tumor, clot material, or a combination. Follow-up is required. Urology consultation and follow-up is recommended. The prostate is enlarged. Clinical and laboratory (PSA) correlation recommended. There is a 1.2 cm indeterminate hypodense lesion within the lateral aspect of the interpolar region right kidney. Follow-up is recommended. Nonemergent outpatient ultrasound is suggested as the initial step in evaluating this lesion. If this cannot be proven to represent a simple cyst on ultrasound, renal protocol CT or MRI would be suggested for further characterization, if there are no contraindications. Cholelithiasis. No CT evidence of acute cholecystitis. Colonic diverticulosis without evidence of acute diverticulitis. Atherosclerosis. Coronary artery disease. Other nonemergent findings as above. This examination demonstrates findings for which clinical and imaging follow-up is recommended and was logged as such in EPIC. I personally discussed this study with REINA SANCHEZ on 5/22/2024 4:46 AM. Workstation performed: [id number]     MDM: High   Patient requires hospitalization for gross hematuria  CBC, CMP, CT scan  reviewed  Reviewed external notes from PCP  Manual irrigation  Consult urology  CBC BMP and PSA ordered  Prescription drug therapy with IV Rocephin    Epic Records Reviewed as well as Records in Care Everywhere    ** Please Note: Dragon 360 Dictation voice to text software was used in the creation of this document. **

## 2024-05-22 NOTE — ED PROVIDER NOTES
"History  Chief Complaint   Patient presents with    Blood in Urine     Pt states bright red blood in urine starting yesterday evening it has not been getting lighter or stopping per pt. No abdominal pain, no N/V      83-year-old male presents to the emergency room with a chief complaint of painless hematuria.    Patient states he awoke and \"I had a plug in there this morning\" though he states it cleared up throughout the day and then tonight around 1900 hrs. became acutely bloody.    Patient denies any dysuria or abdominal pain.  Patient denies any fever.    Patient is on aspirin  Patient denies any similar prior episodes.    Patient does note an episode of back pain last week though he states this was transient and resolved.    Patient denies any falls or trauma.    Impression and plan: Hematuria with a broad differential.  Based on patient's history and physical, concerns for potential painless hematuria.  Will obtain laboratory evaluation including urinalysis.  Patient denies any pain today though he did note twinges last week, unclear if this could represent a kidney stone that passed now has bladder stone so will obtain CT imaging.  Patient with post void of over 200 mL concerns for potential clot considering history.  Will monitor and reassess.        Prior to Admission Medications   Prescriptions Last Dose Informant Patient Reported? Taking?   Advair -21 MCG/ACT inhaler 5/21/2024 Self Yes Yes   Cholecalciferol 25 MCG (1000 UT) capsule Not Taking Self Yes No   Sig: Take 1,000 Units by mouth daily   Patient not taking: Reported on 5/22/2024   Omega-3 Fatty Acids (fish oil) 1,000 mg Not Taking Self Yes No   Sig: Take 1,000 mg by mouth daily   Patient not taking: Reported on 5/22/2024   Omeprazole Magnesium (PriLOSEC) 10 MG PACK Not Taking Self Yes No   Sig: Take 20 mg by mouth daily     Patient not taking: Reported on 5/22/2024   Vitamin E 400 units CHEW Not Taking Self Yes No   Sig: Chew   Patient not " taking: Reported on 5/22/2024   albuterol (PROVENTIL HFA,VENTOLIN HFA) 90 mcg/act inhaler Past Month Self Yes Yes   albuterol (ProAir HFA) 90 mcg/act inhaler Past Month Self Yes Yes   Sig: Inhale 1 puff   aspirin (ECOTRIN LOW STRENGTH) 81 mg EC tablet 5/21/2024 Self Yes Yes   Sig: tablet,delayed release (DR/EC): 81 mg 1 once a day   celecoxib (CeleBREX) 200 mg capsule 5/21/2024 Self Yes Yes   Sig: Take 200 mg by mouth daily   famotidine (PEPCID) 20 mg tablet 5/21/2024 Self Yes Yes   Sig: Take 1 tablet by mouth every morning   fexofenadine (ALLEGRA) 60 MG tablet Not Taking Self Yes No   Sig: Take 60 mg by mouth as needed     Patient not taking: Reported on 5/22/2024   gabapentin (NEURONTIN) 100 mg capsule Not Taking Self Yes No   Patient not taking: Reported on 5/22/2024   gabapentin (NEURONTIN) 300 mg capsule Not Taking Self Yes No   Sig: Take 300 mg by mouth as needed     Patient not taking: Reported on 5/22/2024   montelukast (SINGULAIR) 10 mg tablet 5/21/2024 Self Yes Yes   Sig: Take 10 mg by mouth daily     predniSONE 20 mg tablet Not Taking Self Yes No   Sig: TAKE 1 TABLET BY MOUTH TWICE A DAY FOR 2 DAYS THEN TAKE 1 TABLET BY MOUTH EVERY DAY FOR 5 DAYS RESCUE GENNY   Patient not taking: Reported on 5/22/2024   ramipril (ALTACE) 10 MG capsule 5/21/2024 Self Yes Yes   Sig: Take 10 mg by mouth daily     rosuvastatin (CRESTOR) 40 MG tablet 5/21/2024 Self Yes Yes   Sig: Take 20 mg by mouth daily      Facility-Administered Medications: None       History reviewed. No pertinent past medical history.    History reviewed. No pertinent surgical history.    Family History   Problem Relation Age of Onset    Heart disease Father     Heart failure Father     Hypertension Father      I have reviewed and agree with the history as documented.    E-Cigarette/Vaping    E-Cigarette Use Never User      E-Cigarette/Vaping Substances    Nicotine No     THC No     CBD No     Flavoring No     Other No     Unknown No      Social History      Tobacco Use    Smoking status: Never    Smokeless tobacco: Never   Vaping Use    Vaping status: Never Used   Substance Use Topics    Alcohol use: Not Currently    Drug use: Never       Review of Systems    Physical Exam  Physical Exam  Pulmonary:      Effort: Pulmonary effort is normal.      Breath sounds: Normal breath sounds.   Abdominal:      Palpations: Abdomen is soft.      Tenderness: There is no abdominal tenderness. There is no right CVA tenderness, left CVA tenderness, guarding or rebound.         Vital Signs  ED Triage Vitals   Temperature Pulse Respirations Blood Pressure SpO2   05/22/24 0053 05/22/24 0053 05/22/24 0053 05/22/24 0053 05/22/24 0053   98.1 °F (36.7 °C) 74 18 (!) 189/88 98 %      Temp Source Heart Rate Source Patient Position - Orthostatic VS BP Location FiO2 (%)   05/22/24 0053 05/22/24 0053 05/22/24 0053 05/22/24 0053 --   Oral Monitor Sitting Right arm       Pain Score       05/22/24 0916       5           Vitals:    05/22/24 0615 05/22/24 0630 05/22/24 0729 05/22/24 2209   BP: (!) 204/96 (!) 198/93 157/81 133/76   Pulse: 74 67 76    Patient Position - Orthostatic VS: Sitting Sitting  Lying         Visual Acuity      ED Medications  Medications   celecoxib (CeleBREX) capsule 200 mg (200 mg Oral Given 5/22/24 0916)   Cholecalciferol (VITAMIN D3) tablet 1,000 Units (1,000 Units Oral Given 5/22/24 0916)   famotidine (PEPCID) tablet 20 mg (20 mg Oral Given 5/22/24 0916)   montelukast (SINGULAIR) tablet 10 mg (10 mg Oral Given 5/22/24 0916)   pantoprazole (PROTONIX) EC tablet 20 mg (20 mg Oral Given 5/23/24 0541)   lisinopril (ZESTRIL) tablet 10 mg (10 mg Oral Given 5/22/24 0916)   atorvastatin (LIPITOR) tablet 40 mg (40 mg Oral Given 5/22/24 1625)   multi-electrolyte (PLASMALYTE-A/ISOLYTE-S PH 7.4) IV solution (75 mL/hr Intravenous New Bag 5/23/24 0541)   ceftriaxone (ROCEPHIN) 1 g/50 mL in dextrose IVPB (has no administration in time range)   acetaminophen (TYLENOL) tablet 650 mg (650 mg  Oral Given 5/22/24 0925)   albuterol (PROVENTIL HFA,VENTOLIN HFA) inhaler 2 puff (has no administration in time range)   budesonide-formoterol (SYMBICORT) 160-4.5 mcg/act inhaler 2 puff (2 puffs Inhalation Given 5/22/24 1901)   finasteride (PROSCAR) tablet 5 mg (5 mg Oral Given 5/22/24 1456)   lidocaine (URO-JET) 2 % urethral/mucosal gel 1 Application (1 Application Urethral Given 5/22/24 0503)   ceftriaxone (ROCEPHIN) 1 g/50 mL in dextrose IVPB (0 mg Intravenous Stopped 5/22/24 0549)       Diagnostic Studies  Results Reviewed       Procedure Component Value Units Date/Time    PSA, Total Screen [[id number]]  (Abnormal) Collected: 05/22/24 0954    Lab Status: Final result Specimen: Blood from Arm, Left Updated: 05/22/24 1620     PSA 10.84 ng/mL     Narrative:      American U[employer] Association Guidelines define biochemical recurrence of prostate cancer as a detectable or rising PSA value post-radical prostatectomy that is greater than or equal to 0.2 ng/mL with a second confirmatory level of greater than or equal to 0.2 ng/mL.    Basic metabolic panel [[id number]] Collected: 05/22/24 0954    Lab Status: Final result Specimen: Blood from Arm, Left Updated: 05/22/24 1029     Sodium 138 mmol/L      Potassium 4.3 mmol/L      Chloride 106 mmol/L      CO2 25 mmol/L      ANION GAP 7 mmol/L      BUN 17 mg/dL      Creatinine 1.10 mg/dL      Glucose 100 mg/dL      Calcium 8.9 mg/dL      eGFR 61 ml/min/1.73sq m     Narrative:      National Kidney Disease Foundation guidelines for Chronic Kidney Disease (CKD):     Stage 1 with normal or high GFR (GFR > 90 mL/min/1.73 square meters)    Stage 2 Mild CKD (GFR = 60-89 mL/min/1.73 square meters)    Stage 3A Moderate CKD (GFR = 45-59 mL/min/1.73 square meters)    Stage 3B Moderate CKD (GFR = 30-44 mL/min/1.73 square meters)    Stage 4 Severe CKD (GFR = 15-29 mL/min/1.73 square meters)    Stage 5 End Stage CKD (GFR <15 mL/min/1.73 square meters)  Note: GFR calculation is accurate only  with a steady state creatinine    CBC and Platelet [[id number]]  (Abnormal) Collected: 05/22/24 0954    Lab Status: Final result Specimen: Blood from Arm, Left Updated: 05/22/24 1005     WBC 11.86 Thousand/uL      RBC 4.05 Million/uL      Hemoglobin 13.0 g/dL      Hematocrit 38.7 %      MCV 96 fL      MCH 32.1 pg      MCHC 33.6 g/dL      RDW 12.0 %      Platelets 207 Thousands/uL      MPV 8.9 fL     Blood culture #1 [[id number]] Collected: 05/22/24 0533    Lab Status: Preliminary result Specimen: Blood from Arm, Left Updated: 05/22/24 0901     Blood Culture Received in Microbiology Lab. Culture in Progress.    Blood culture #2 [[id number]] Collected: 05/22/24 0533    Lab Status: Preliminary result Specimen: Blood from Arm, Right Updated: 05/22/24 0901     Blood Culture Received in Microbiology Lab. Culture in Progress.    Urine culture [[id number]] Collected: 05/22/24 0534    Lab Status: In process Specimen: Urine, Indwelling Roman Catheter Updated: 05/22/24 0550    Urine Microscopic [[id number]]  (Abnormal) Collected: 05/22/24 0248    Lab Status: Final result Specimen: Urine, Clean Catch Updated: 05/22/24 0304     RBC, UA Innumerable /hpf      WBC, UA 20-30 /hpf      Epithelial Cells None Seen /hpf      Bacteria, UA Occasional /hpf     UA (URINE) with reflex to Scope [[id number]]  (Abnormal) Collected: 05/22/24 0248    Lab Status: Final result Specimen: Urine, Clean Catch Updated: 05/22/24 0302     Color, UA Red     Clarity, UA Slightly Cloudy     Specific Gravity, UA 1.015     pH, UA 7.5     Leukocytes, UA Moderate     Nitrite, UA Positive     Protein, UA >=300 mg/dl      Glucose, UA Negative mg/dl      Ketones, UA 40 (2+) mg/dl      Bilirubin, UA Moderate     Occult Blood, UA Large     Urobilinogen, UA >=8.0 E.U./dl     CK [[id number]]  (Normal) Collected: 05/22/24 0200    Lab Status: Final result Specimen: Blood from Arm, Right Updated: 05/22/24 0242     Total CK 81 U/L     Protime-INR [[id number]]  (Abnormal)  Collected: 05/22/24 0200    Lab Status: Final result Specimen: Blood from Arm, Right Updated: 05/22/24 0228     Protime 14.7 seconds      INR 1.09    APTT [[id number]]  (Normal) Collected: 05/22/24 0200    Lab Status: Final result Specimen: Blood from Arm, Right Updated: 05/22/24 0228     PTT 35 seconds     Comprehensive metabolic panel [[id number]] Collected: 05/22/24 0200    Lab Status: Final result Specimen: Blood from Arm, Right Updated: 05/22/24 0228     Sodium 138 mmol/L      Potassium 4.2 mmol/L      Chloride 107 mmol/L      CO2 25 mmol/L      ANION GAP 6 mmol/L      BUN 17 mg/dL      Creatinine 1.12 mg/dL      Glucose 107 mg/dL      Calcium 8.7 mg/dL      AST 20 U/L      ALT 13 U/L      Alkaline Phosphatase 44 U/L      Total Protein 6.6 g/dL      Albumin 3.8 g/dL      Total Bilirubin 0.48 mg/dL      eGFR 60 ml/min/1.73sq m     Narrative:      National Kidney Disease Foundation guidelines for Chronic Kidney Disease (CKD):     Stage 1 with normal or high GFR (GFR > 90 mL/min/1.73 square meters)    Stage 2 Mild CKD (GFR = 60-89 mL/min/1.73 square meters)    Stage 3A Moderate CKD (GFR = 45-59 mL/min/1.73 square meters)    Stage 3B Moderate CKD (GFR = 30-44 mL/min/1.73 square meters)    Stage 4 Severe CKD (GFR = 15-29 mL/min/1.73 square meters)    Stage 5 End Stage CKD (GFR <15 mL/min/1.73 square meters)  Note: GFR calculation is accurate only with a steady state creatinine    Urine Microscopic [[id number]]  (Normal) Collected: 05/22/24 0200    Lab Status: Final result Specimen: Urine, Clean Catch Updated: 05/22/24 0226     RBC, UA None Seen /hpf      WBC, UA None Seen /hpf      Epithelial Cells None Seen /hpf      Bacteria, UA None Seen /hpf     UA w Reflex to Microscopic w Reflex to Culture [[id number]]  (Abnormal) Collected: 05/22/24 0200    Lab Status: Final result Specimen: Urine, Clean Catch Updated: 05/22/24 0225     Color, UA Light Red     Clarity, UA Turbid     Specific Gravity, UA 1.013     pH, UA 7.0      Leukocytes, UA Negative     Nitrite, UA Negative     Protein,  (2+) mg/dl      Glucose, UA Negative mg/dl      Ketones, UA Negative mg/dl      Urobilinogen, UA <2.0 mg/dl      Bilirubin, UA Negative     Occult Blood, UA Large    CBC and differential [[id number]] Collected: 05/22/24 0200    Lab Status: Final result Specimen: Blood from Arm, Right Updated: 05/22/24 0210     WBC 7.33 Thousand/uL      RBC 4.07 Million/uL      Hemoglobin 13.2 g/dL      Hematocrit 38.4 %      MCV 94 fL      MCH 32.4 pg      MCHC 34.4 g/dL      RDW 12.1 %      MPV 9.1 fL      Platelets 209 Thousands/uL      nRBC 0 /100 WBCs      Segmented % 66 %      Immature Grans % 0 %      Lymphocytes % 16 %      Monocytes % 12 %      Eosinophils Relative 6 %      Basophils Relative 0 %      Absolute Neutrophils 4.87 Thousands/µL      Absolute Immature Grans 0.02 Thousand/uL      Absolute Lymphocytes 1.14 Thousands/µL      Absolute Monocytes 0.87 Thousand/µL      Eosinophils Absolute 0.40 Thousand/µL      Basophils Absolute 0.03 Thousands/µL                    CT abdomen pelvis wo contrast   Final Result by Evita Owen MD (05/22 0457)      There is lobulated appearing hyperdense material within the posterior aspect of the lower urinary bladder, measuring approximately 5.8 x 2.3 cm. This may represent tumor, clot material, or a combination. Follow-up is required. Urology consultation and    follow-up is recommended.      The prostate is enlarged. Clinical and laboratory (PSA) correlation recommended.      There is a 1.2 cm indeterminate hypodense lesion within the lateral aspect of the interpolar region right kidney. Follow-up is recommended. Nonemergent outpatient ultrasound is suggested as the initial step in evaluating this lesion. If this cannot be    proven to represent a simple cyst on ultrasound, renal protocol CT or MRI would be suggested for further characterization, if there are no contraindications.      Cholelithiasis. No CT  evidence of acute cholecystitis.      Colonic diverticulosis without evidence of acute diverticulitis.      Atherosclerosis. Coronary artery disease.      Other nonemergent findings as above.      This examination demonstrates findings for which clinical and imaging follow-up is recommended and was logged as such in EPIC.         I personally discussed this study with REINA SANCHEZ on 5/22/2024 4:46 AM.            Workstation performed: [id number]                    Procedures  Procedures         ED Course  ED Course as of 05/23/24 0642   Wed May 22, 2024   0326 Initial urine microscopy did not demonstrate any RBCs which seems unlikely.  This was repeated and demonstrated innumerable RBCs which is far more likely and I believe the initial testing was secondary to lab error.   0516 Patient does not meet SIRS criteria.  Patient does not have an obvious source of infection though his urine does have nitrites in it and 20-30 WBCs.  Considering this, will place patient on a course of ceftriaxone though he does not have typical symptoms of urinary tract infection.  Patient does not have sepsis.    Discussed with urology who suggested placement of a three-way Roman catheter and manually irrigating prior to starting CBI.  Will keep patient n.p.o. and admit for continued monitoring and evaluation.                               SBIRT 20yo+      Flowsheet Row Most Recent Value   Initial Alcohol Screen: US AUDIT-C     1. How often do you have a drink containing alcohol? 0 Filed at: 05/22/2024 0055   2. How many drinks containing alcohol do you have on a typical day you are drinking?  0 Filed at: 05/22/2024 0055   3b. FEMALE Any Age, or MALE 65+: How often do you have 4 or more drinks on one occassion? 0 Filed at: 05/22/2024 0055   Audit-C Score 0 Filed at: 05/22/2024 0055   MERRILL: How many times in the past year have you...    Used an illegal drug or used a prescription medication for non-medical reasons? Never Filed at:  05/22/2024 0055                      Medical Decision Making  Amount and/or Complexity of Data Reviewed  Labs: ordered.  Radiology: ordered.    Risk  Prescription drug management.  Decision regarding hospitalization.             Disposition  Final diagnoses:   Hematuria   Abnormal CT scan, bladder   Abnormal CT scan, kidney     Time reflects when diagnosis was documented in both MDM as applicable and the Disposition within this note       Time User Action Codes Description Comment    5/22/2024  5:22 AM Kimberley More LONG Add [R31.9] Hematuria, unspecified type     5/22/2024  5:28 AM Seb Hoff Add [R31.9] Hematuria     5/22/2024  5:28 AM Seb Hoff Add [R93.41] Abnormal CT scan, bladder     5/22/2024  5:28 AM Seb Hoff Add [R93.429] Abnormal CT scan, kidney           ED Disposition       ED Disposition   Admit    Condition   Stable    Date/Time   Wed May 22, 2024 0528    Comment   Case was discussed with TORSTEN and the patient's admission status was agreed to be Admission Status: inpatient status to the service of Dr. Merritt .               Follow-up Information    None         Current Discharge Medication List        CONTINUE these medications which have NOT CHANGED    Details   Advair -21 MCG/ACT inhaler       !! albuterol (ProAir HFA) 90 mcg/act inhaler Inhale 1 puff      !! albuterol (PROVENTIL HFA,VENTOLIN HFA) 90 mcg/act inhaler       aspirin (ECOTRIN LOW STRENGTH) 81 mg EC tablet tablet,delayed release (DR/EC): 81 mg 1 once a day      celecoxib (CeleBREX) 200 mg capsule Take 200 mg by mouth daily      famotidine (PEPCID) 20 mg tablet Take 1 tablet by mouth every morning      montelukast (SINGULAIR) 10 mg tablet Take 10 mg by mouth daily        ramipril (ALTACE) 10 MG capsule Take 10 mg by mouth daily        rosuvastatin (CRESTOR) 40 MG tablet Take 20 mg by mouth daily      Cholecalciferol 25 MCG (1000 UT) capsule Take 1,000 Units by mouth daily      fexofenadine (ALLEGRA) 60 MG tablet Take 60 mg  by mouth as needed        !! gabapentin (NEURONTIN) 100 mg capsule       !! gabapentin (NEURONTIN) 300 mg capsule Take 300 mg by mouth as needed        Omega-3 Fatty Acids (fish oil) 1,000 mg Take 1,000 mg by mouth daily      Omeprazole Magnesium (PriLOSEC) 10 MG PACK Take 20 mg by mouth daily        predniSONE 20 mg tablet TAKE 1 TABLET BY MOUTH TWICE A DAY FOR 2 DAYS THEN TAKE 1 TABLET BY MOUTH EVERY DAY FOR 5 DAYS RESCUE GENNY      Vitamin E 400 units CHEW Chew       !! - Potential duplicate medications found. Please discuss with provider.          No discharge procedures on file.    PDMP Review       None            ED Provider  Electronically Signed by             Seb Hoff MD  05/23/24 8282

## 2024-05-22 NOTE — TREATMENT PLAN
Discussed with urology.  Patient has been started on continuous bladder irrigation for the hematuria.  Will monitor vitals and hemoglobin closely    Blood pressure 157/81, pulse 76, temperature 97.8 °F (36.6 °C), resp. rate 18, height 6' (1.829 m), weight 97.6 kg (215 lb 2.7 oz), SpO2 94%.

## 2024-05-23 LAB
ANION GAP SERPL CALCULATED.3IONS-SCNC: 7 MMOL/L (ref 4–13)
BACTERIA UR CULT: NORMAL
BASOPHILS # BLD AUTO: 0.06 THOUSANDS/ÂΜL (ref 0–0.1)
BASOPHILS NFR BLD AUTO: 1 % (ref 0–1)
BUN SERPL-MCNC: 14 MG/DL (ref 5–25)
CALCIUM SERPL-MCNC: 9 MG/DL (ref 8.4–10.2)
CHLORIDE SERPL-SCNC: 109 MMOL/L (ref 96–108)
CO2 SERPL-SCNC: 22 MMOL/L (ref 21–32)
CREAT SERPL-MCNC: 1.11 MG/DL (ref 0.6–1.3)
EOSINOPHIL # BLD AUTO: 0.37 THOUSAND/ÂΜL (ref 0–0.61)
EOSINOPHIL NFR BLD AUTO: 3 % (ref 0–6)
ERYTHROCYTE [DISTWIDTH] IN BLOOD BY AUTOMATED COUNT: 12.2 % (ref 11.6–15.1)
GFR SERPL CREATININE-BSD FRML MDRD: 61 ML/MIN/1.73SQ M
GLUCOSE SERPL-MCNC: 110 MG/DL (ref 65–140)
HCT VFR BLD AUTO: 44.2 % (ref 36.5–49.3)
HGB BLD-MCNC: 14.8 G/DL (ref 12–17)
IMM GRANULOCYTES # BLD AUTO: 0.04 THOUSAND/UL (ref 0–0.2)
IMM GRANULOCYTES NFR BLD AUTO: 0 % (ref 0–2)
LYMPHOCYTES # BLD AUTO: 2 THOUSANDS/ÂΜL (ref 0.6–4.47)
LYMPHOCYTES NFR BLD AUTO: 16 % (ref 14–44)
MCH RBC QN AUTO: 32 PG (ref 26.8–34.3)
MCHC RBC AUTO-ENTMCNC: 33.5 G/DL (ref 31.4–37.4)
MCV RBC AUTO: 96 FL (ref 82–98)
MONOCYTES # BLD AUTO: 1.38 THOUSAND/ÂΜL (ref 0.17–1.22)
MONOCYTES NFR BLD AUTO: 11 % (ref 4–12)
NEUTROPHILS # BLD AUTO: 8.49 THOUSANDS/ÂΜL (ref 1.85–7.62)
NEUTS SEG NFR BLD AUTO: 69 % (ref 43–75)
NRBC BLD AUTO-RTO: 0 /100 WBCS
PLATELET # BLD AUTO: 263 THOUSANDS/UL (ref 149–390)
PMV BLD AUTO: 9.3 FL (ref 8.9–12.7)
POTASSIUM SERPL-SCNC: 4.4 MMOL/L (ref 3.5–5.3)
RBC # BLD AUTO: 4.62 MILLION/UL (ref 3.88–5.62)
SODIUM SERPL-SCNC: 138 MMOL/L (ref 135–147)
WBC # BLD AUTO: 12.34 THOUSAND/UL (ref 4.31–10.16)

## 2024-05-23 PROCEDURE — 85025 COMPLETE CBC W/AUTO DIFF WBC: CPT | Performed by: INTERNAL MEDICINE

## 2024-05-23 PROCEDURE — 99232 SBSQ HOSP IP/OBS MODERATE 35: CPT | Performed by: NURSE PRACTITIONER

## 2024-05-23 PROCEDURE — 99232 SBSQ HOSP IP/OBS MODERATE 35: CPT

## 2024-05-23 PROCEDURE — 80048 BASIC METABOLIC PNL TOTAL CA: CPT | Performed by: INTERNAL MEDICINE

## 2024-05-23 RX ADMIN — FINASTERIDE 5 MG: 5 TABLET, FILM COATED ORAL at 09:53

## 2024-05-23 RX ADMIN — MONTELUKAST 10 MG: 10 TABLET, FILM COATED ORAL at 09:53

## 2024-05-23 RX ADMIN — PANTOPRAZOLE SODIUM 20 MG: 20 TABLET, DELAYED RELEASE ORAL at 05:41

## 2024-05-23 RX ADMIN — CELECOXIB 200 MG: 100 CAPSULE ORAL at 09:53

## 2024-05-23 RX ADMIN — SODIUM CHLORIDE, SODIUM GLUCONATE, SODIUM ACETATE, POTASSIUM CHLORIDE, MAGNESIUM CHLORIDE, SODIUM PHOSPHATE, DIBASIC, AND POTASSIUM PHOSPHATE 75 ML/HR: .53; .5; .37; .037; .03; .012; .00082 INJECTION, SOLUTION INTRAVENOUS at 05:41

## 2024-05-23 RX ADMIN — ATORVASTATIN CALCIUM 40 MG: 40 TABLET, FILM COATED ORAL at 17:49

## 2024-05-23 RX ADMIN — BUDESONIDE AND FORMOTEROL FUMARATE DIHYDRATE 2 PUFF: 160; 4.5 AEROSOL RESPIRATORY (INHALATION) at 09:56

## 2024-05-23 RX ADMIN — Medication 1000 UNITS: at 09:53

## 2024-05-23 RX ADMIN — BUDESONIDE AND FORMOTEROL FUMARATE DIHYDRATE 2 PUFF: 160; 4.5 AEROSOL RESPIRATORY (INHALATION) at 17:56

## 2024-05-23 RX ADMIN — CEFTRIAXONE SODIUM 1000 MG: 10 INJECTION, POWDER, FOR SOLUTION INTRAVENOUS at 09:58

## 2024-05-23 RX ADMIN — LISINOPRIL 10 MG: 10 TABLET ORAL at 09:53

## 2024-05-23 RX ADMIN — FAMOTIDINE 20 MG: 20 TABLET, FILM COATED ORAL at 09:53

## 2024-05-23 NOTE — PROGRESS NOTES
[facility]  Progress Note  Name: Anthony Nelson I  MRN: [id number]  Unit/Bed#: -01 I Date of Admission: 5/22/2024   Date of Service: 5/23/2024 I Hospital Day: 1    Assessment & Plan   * Gross hematuria  Assessment & Plan  Painless hematuria  CT scan: Lobulated appearing hyperdense material within the posterior aspect of the lower bladder 5.8 x 2.3 cm.  This may represent a tumor, clot material or combination.  Urology consulted-No need for OR at this time  S/p CBI overnight. Urine light blush. Irrigated with 300 cc clear irrigation. One small clot.   CBI clamped 5/23  Continue to monitor urine output  Hgb 14, stable  Patient follows with urologist in [place]. Reports his previous PSA this past year was 6. Elevation can be related to finn catheter placement. Would repeat as outpatient after catheter removed in a few weeks.  Continue proscar to decrease prostate vascularity  Will require office cystoscopy for evaluation of hematuria    Acute cystitis with hematuria  Assessment & Plan  Rocephin 1 g IV daily    Elevated prostate specific antigen (PSA)  Assessment & Plan  PSA 10.4    COPD, group B, by GOLD 2017 classification (HCC)  Assessment & Plan  Uses inhalers at home.           VTE Pharmacologic Prophylaxis: VTE Score: 4 Moderate Risk (Score 3-4) - Pharmacological DVT Prophylaxis Contraindicated. Sequential Compression Devices Ordered.    Mobility:   Basic Mobility Inpatient Raw Score: 24  JH-HLM Goal: 8: Walk 250 feet or more  JH-HLM Achieved: 7: Walk 25 feet or more  JH-HLM Goal NOT achieved. Continue with multidisciplinary rounding and encourage appropriate mobility to improve upon JH-HLM goals.    Patient Centered Rounds: I performed bedside rounds with nursing staff today.   Discussions with Specialists or Other Care Team Provider: Reviewed urology note    Education and Discussions with Family / Patient: With patient    Total Time Spent on Date of Encounter in care of patient:  35 mins. This time was spent on one or more of the following: performing physical exam; counseling and coordination of care; obtaining or reviewing history; documenting in the medical record; reviewing/ordering tests, medications or procedures; communicating with other healthcare professionals and discussing with patient's family/caregivers.    Current Length of Stay: 1 day(s)  Current Patient Status: Inpatient   Certification Statement: The patient will continue to require additional inpatient hospital stay due to monitoring of urine output and hematuria  Discharge Plan: Anticipate discharge tomorrow to home.    Code Status: Level 1 - Full Code    Subjective:   Patient is out of bed he is in the chair Roman catheter is in place draining CBI is clamped he denies any chest pain chest tightness shortness of breath or difficulty breathing he does report some discomfort related to the Roman catheter but overall appears well and is in good spirits eager for discharge but agreeable to stay till tomorrow to monitor clots and urine output    Objective:     Vitals:   Temp (24hrs), Av.2 °F (36.8 °C), Min:98 °F (36.7 °C), Max:98.4 °F (36.9 °C)    Temp:  [98 °F (36.7 °C)-98.4 °F (36.9 °C)] 98 °F (36.7 °C)  HR:  [95] 95  Resp:  [18] 18  BP: (131-133)/(76-83) 131/83  SpO2:  [94 %] 94 %  Body mass index is 29.18 kg/m².     Input and Output Summary (last 24 hours):     Intake/Output Summary (Last 24 hours) at 2024 1231  Last data filed at 2024 0818  Gross per 24 hour   Intake 720 ml   Output 24440 ml   Net -69474 ml       Physical Exam:   Physical Exam  Vitals reviewed.   Constitutional:       General: He is not in acute distress.     Appearance: He is not ill-appearing.   Cardiovascular:      Rate and Rhythm: Normal rate.   Pulmonary:      Effort: No respiratory distress.   Skin:     General: Skin is warm.      Coloration: Skin is pale.   Neurological:      Mental Status: He is alert. Mental status is at baseline.    Psychiatric:         Mood and Affect: Mood normal.          Additional Data:     Labs:  Results from last 7 days   Lab Units 05/23/24  0524   WBC Thousand/uL 12.34*   HEMOGLOBIN g/dL 14.8   HEMATOCRIT % 44.2   PLATELETS Thousands/uL 263   SEGS PCT % 69   LYMPHO PCT % 16   MONO PCT % 11   EOS PCT % 3     Results from last 7 days   Lab Units 05/23/24  0524 05/22/24  0954 05/22/24  0200   SODIUM mmol/L 138   < > 138   POTASSIUM mmol/L 4.4   < > 4.2   CHLORIDE mmol/L 109*   < > 107   CO2 mmol/L 22   < > 25   BUN mg/dL 14   < > 17   CREATININE mg/dL 1.11   < > 1.12   ANION GAP mmol/L 7   < > 6   CALCIUM mg/dL 9.0   < > 8.7   ALBUMIN g/dL  --   --  3.8   TOTAL BILIRUBIN mg/dL  --   --  0.48   ALK PHOS U/L  --   --  44   ALT U/L  --   --  13   AST U/L  --   --  20   GLUCOSE RANDOM mg/dL 110   < > 107    < > = values in this interval not displayed.     Results from last 7 days   Lab Units 05/22/24  0200   INR  1.09                   Lines/Drains:  Invasive Devices       Peripheral Intravenous Line  Duration             Peripheral IV 05/23/24 Right Antecubital <1 day              Drain  Duration             Urethral Catheter Three way 22 Fr. 1 day                  Urinary Catheter:  Goal for removal: N/A- Discharging with Roman         Recent Cultures (last 7 days):   Results from last 7 days   Lab Units 05/22/24  0534 05/22/24  0533   BLOOD CULTURE   --  No Growth at 24 hrs.  No Growth at 24 hrs.   URINE CULTURE  No Growth <1000 cfu/mL  --        Last 24 Hours Medication List:   Current Facility-Administered Medications   Medication Dose Route Frequency Provider Last Rate    acetaminophen  650 mg Oral Q6H PRN Tien Trejo MD      albuterol  2 puff Inhalation Q4H PRN Tien Trejo MD      atorvastatin  40 mg Oral Daily With Dinner Wes Merritt MD      budesonide-formoterol  2 puff Inhalation BID Tien Trejo MD      cefTRIAXone  1,000 mg Intravenous Q24H Tien Trejo MD 1,000 mg  (05/23/24 0958)    celecoxib  200 mg Oral Daily Wes Merritt MD      Cholecalciferol  1,000 Units Oral Daily Wes Merritt MD      famotidine  20 mg Oral QAM Wes Merritt MD      finasteride  5 mg Oral Daily Marie Lopez PA-C      lisinopril  10 mg Oral Daily Wes Merritt MD      montelukast  10 mg Oral Daily Wes Merritt MD      multi-electrolyte  75 mL/hr Intravenous Continuous Tien Trejo MD 75 mL/hr (05/23/24 0541)    pantoprazole  20 mg Oral Early Morning Wes Merritt MD          Today, Patient Was Seen By: ROCIO Stone    **Please Note: This note may have been constructed using a voice recognition system.**

## 2024-05-23 NOTE — CASE MANAGEMENT
Case Management Assessment & Discharge Planning Note    Patient name Anthony Nelson  Location /-01 MRN [id number]  : 1940 Date 2024       Current Admission Date: 2024  Current Admission Diagnosis:Gross hematuria   Patient Active Problem List    Diagnosis Date Noted Date Diagnosed    Gross hematuria 2024     GERD (gastroesophageal reflux disease) 2024     Mixed hyperlipidemia 2024     Acute cystitis with hematuria 2024     Elevated prostate specific antigen (PSA) 2023     COPD, group B, by GOLD 2017 classification ([facility]) 2023     Coronary artery disease involving native coronary artery of native heart without angina pectoris 2023     Stenosis of right carotid artery 2023       LOS (days): 1  Geometric Mean LOS (GMLOS) (days): 2.9  Days to GMLOS:1.6     OBJECTIVE:    Risk of Unplanned Readmission Score: 5.74         Current admission status: Inpatient       Preferred Pharmacy:   [facility]/pharmacy #[id number] - [place] - [address]  [address]  [place] [address]  Phone: [phone] Fax: [phone]    Primary Care Provider: Michi Lynne MD    Primary Insurance: MEDICARE  Secondary Insurance: [facility] BLUE SHIELD    ASSESSMENT:  Active Health Care Proxies    There are no active Health Care Proxies on file.       Advance Directives  Does patient have a Health Care POA?: No  Was patient offered paperwork?: Yes  Does patient currently have a Health Care decision maker?: Yes, please see Health Care Proxy section  Does patient have Advance Directives?: No  Was patient offered paperwork?: Yes  Primary Contact: Nancy Nelson (Spouse) [phone]         Readmission Root Cause  30 Day Readmission: No    Patient Information  Admitted from:: Home  Mental Status: Alert  During Assessment patient was accompanied by: Not accompanied during assessment  Assessment information provided by:: Patient  Primary Caregiver:  Self  Support Systems: Spouse/significant other, Friend, Friends/neighbors, Children, Family members  County of Residence: [place]  [place] [facility] do you [place]  Home entry access options. Select all that apply.: Stairs  Number of steps to enter home.: 3  Type of Current Residence: 2 story home  Upon entering residence, is there a bedroom on the main floor (no further steps)?: Yes  Upon entering residence, is there a bathroom on the main floor (no further steps)?: Yes  Living Arrangements: Lives w/ Spouse/significant other  Is patient a ?: No    Activities of Daily Living Prior to Admission  Functional Status: Independent  Completes ADLs independently?: Yes  Ambulates independently?: Yes  Does patient use assisted devices?: No  Does patient currently own DME?: No  Does patient have a history of Outpatient Therapy (PT/OT)?: Yes  Does the patient have a history of Short-Term Rehab?: No  Does patient have a history of HHC?: No  Does patient currently have HHC?: No         Patient Information Continued  Income Source: Employed  Does patient have prescription coverage?: Yes  Does patient receive dialysis treatments?: No  Does patient have a history of substance abuse?: No  Does patient have a history of Mental Health Diagnosis?: No    PHQ 2/9 Screening   Reviewed PHQ 2/9 Depression Screening Score?: No    Means of Transportation  Means of Transport to Appts:: Drives Self      Social Determinants of Health (SDOH)      Flowsheet Row Most Recent Value   Housing Stability    In the last 12 months, was there a time when you were not able to pay the mortgage or rent on time? N   In the past 12 months, how many times have you moved where you were living? --  [pt has a home in [place]]   At any time in the past 12 months, were you homeless or living in a shelter (including now)? N   Transportation Needs    In the past 12 months, has lack of transportation kept you from medical appointments or from getting  medications? no   In the past 12 months, has lack of transportation kept you from meetings, work, or from getting things needed for daily living? No   Food Insecurity    Within the past 12 months, you worried that your food would run out before you got the money to buy more. Never true   Within the past 12 months, the food you bought just didn't last and you didn't have money to get more. Never true   Utilities    In the past 12 months has the electric, gas, oil, or water company threatened to shut off services in your home? No            DISCHARGE DETAILS:    Discharge planning discussed with:: Patient at bedside  Freedom of Choice: Yes  Comments - Freedom of Choice: CM discussed freedom of choice as it pertains to discharge planning. Patient declined needing hhc for finn management. Patient declined rehab. Patient stated he had no needs. CM informed patient if he changes his mind once he has left the hospital he can contact PCP for HHC orders.  CM contacted family/caregiver?: No- see comments (pt stated he would update wife since cm met with him)  Were Treatment Team discharge recommendations reviewed with patient/caregiver?: Yes  Did patient/caregiver verbalize understanding of patient care needs?: Yes  Were patient/caregiver advised of the risks associated with not following Treatment Team discharge recommendations?: Yes         Requested Home Health Care         Is the patient interested in HHC at discharge?: No    DME Referral Provided  Referral made for DME?: No    Other Referral/Resources/Interventions Provided:  Interventions: None Indicated    Would you like to participate in our [facility] service program?  : No - Declined    Treatment Team Recommendation: Home  Discharge Destination Plan:: Home  Transport at Discharge : Family

## 2024-05-23 NOTE — ASSESSMENT & PLAN NOTE
Painless hematuria  CT scan: Lobulated appearing hyperdense material within the posterior aspect of the lower bladder 5.8 x 2.3 cm.  This may represent a tumor, clot material or combination.  Urology consulted-No need for OR at this time  S/p CBI overnight. Urine light blush. Irrigated with 300 cc clear irrigation. One small clot.   CBI clamped 5/23  Continue to monitor urine output  Hgb 14, stable  Patient follows with urologist in [place]. Reports his previous PSA this past year was 6. Elevation can be related to finn catheter placement. Would repeat as outpatient after catheter removed in a few weeks.  Continue proscar to decrease prostate vascularity  Will require office cystoscopy for evaluation of hematuria

## 2024-05-23 NOTE — PROGRESS NOTES
Progress Note - Urology  Anthony Nelson 1940, 83 y.o. male MRN: [id number]    Unit/Bed#: MS Norma Encounter: [id number]    Assessment & Plan:  Hematuria  -CT showing lobulated appearing hyperdense material within the posterior aspect of lower urinary bladder measuring 5.8 x 2.3 cm.  This may represent tumor, or clot material.  -S/p CBI overnight. Urine light blush. Irrigated with 300 cc clear irrigation. One small clot.   -CBI clamped  -Hgb 14, stable  -Wbc 12  -No need for OR  -PSA 10. Patient follows with urologist in [place]. Reports his previous PSA this past year was 6. Elevation can be related to finn catheter placement. Would repeat as outpatient after catheter removed in a few weeks.  -Continue proscar to decrease prostate vascularity  -Will reassess in a couple hours, if urine remains clear, minimal hematuria, stable for discharge with finn catheter for office trial of void.  -Will require office cystoscopy for evaluation of hematuria    Subjective:   HPI: Seen at bedside. Catheter was just drained, nursing reporting light blush. Irrigated at bedside with clear irrigation return, one small clot.     Review of Systems   Constitutional:  Negative for chills and fever.   Respiratory:  Negative for cough and shortness of breath.    Cardiovascular:  Negative for chest pain and palpitations.   Gastrointestinal:  Negative for abdominal pain and vomiting.   Genitourinary:  Positive for hematuria. Negative for dysuria.   Musculoskeletal:  Negative for arthralgias and back pain.   Skin:  Negative for color change and rash.   Neurological:  Negative for seizures and syncope.   All other systems reviewed and are negative.      Objective:    Vitals: Blood pressure 131/83, pulse 95, temperature 98 °F (36.7 °C), resp. rate 18, height 6' (1.829 m), weight 97.6 kg (215 lb 2.7 oz), SpO2 94%.,Body mass index is 29.18 kg/m².    Physical Exam  Constitutional:       General: He is not in acute distress.     Appearance:  Normal appearance. He is normal weight. He is not ill-appearing or toxic-appearing.   HENT:      Head: Normocephalic and atraumatic.      Right Ear: External ear normal.      Left Ear: External ear normal.      Nose: Nose normal.      Mouth/Throat:      Mouth: Mucous membranes are moist.   Eyes:      General: No scleral icterus.     Conjunctiva/sclera: Conjunctivae normal.   Cardiovascular:      Rate and Rhythm: Normal rate.      Pulses: Normal pulses.   Pulmonary:      Effort: Pulmonary effort is normal.   Abdominal:      General: Abdomen is flat. There is no distension.      Palpations: Abdomen is soft.      Tenderness: There is no abdominal tenderness. There is no guarding.   Genitourinary:     Comments: Roman draining clear irrigation, slight blush  Musculoskeletal:      Cervical back: Normal range of motion.   Neurological:      General: No focal deficit present.      Mental Status: He is alert and oriented to person, place, and time. Mental status is at baseline.   Psychiatric:         Mood and Affect: Mood normal.         Behavior: Behavior normal.         Thought Content: Thought content normal.         Judgment: Judgment normal.           Labs:  Recent Labs     05/22/24  0200 05/22/24  0954 05/23/24  0524   WBC 7.33 11.86* 12.34*       Recent Labs     05/22/24  0200 05/22/24  0954 05/22/24  1948 05/23/24  0524   HGB 13.2 13.0 12.4 14.8     Recent Labs     05/22/24  0200 05/22/24  0954 05/22/24 1948 05/23/24  0524   HCT 38.4 38.7 36.5 44.2     Recent Labs     05/22/24  0200 05/22/24  0954 05/23/24  0524   CREATININE 1.12 1.10 1.11       History:  History reviewed. No pertinent past medical history.  Social History     Socioeconomic History    Marital status: /Civil Union     Spouse name: None    Number of children: None    Years of education: None    Highest education level: None   Occupational History    None   Tobacco Use    Smoking status: Never    Smokeless tobacco: Never   Vaping Use    Vaping  status: Never Used   Substance and Sexual Activity    Alcohol use: Not Currently    Drug use: Never    Sexual activity: None   Other Topics Concern    None   Social History Narrative    None     Social Determinants of Health     Financial Resource Strain: Not on file   Food Insecurity: No Food Insecurity (11/29/2023)    Received from [facility]    Hunger Vital Sign     Worried About Running Out of Food in the Last Year: Never true     Ran Out of Food in the Last Year: Never true   Transportation Needs: Not on file   Physical Activity: Not on file   Stress: Not on file   Social Connections: Not on file   Intimate Partner Violence: Not on file   Housing Stability: Not on file     History reviewed. No pertinent surgical history.  Family History   Problem Relation Age of Onset    Heart disease Father     Heart failure Father     Hypertension Father        Marie Lopez PA-C  Date: 5/23/2024 Time: 8:19 AM

## 2024-05-23 NOTE — QUICK NOTE
Reevaluated urine.  Blush colored in bag, dark urine in Roman tubing.  Patient reports worsening whenever he got up and walked around.  Will keep overnight to ensure no worsening hematuria or clots.  No need for CBI. expect discharge tomorrow

## 2024-05-24 ENCOUNTER — TELEPHONE (OUTPATIENT)
Dept: OTHER | Facility: HOSPITAL | Age: 84
End: 2024-05-24

## 2024-05-24 VITALS
OXYGEN SATURATION: 91 % | TEMPERATURE: 98.3 F | DIASTOLIC BLOOD PRESSURE: 76 MMHG | WEIGHT: 215.17 LBS | HEIGHT: 72 IN | RESPIRATION RATE: 17 BRPM | SYSTOLIC BLOOD PRESSURE: 140 MMHG | BODY MASS INDEX: 29.14 KG/M2 | HEART RATE: 71 BPM

## 2024-05-24 LAB
ANION GAP SERPL CALCULATED.3IONS-SCNC: 4 MMOL/L (ref 4–13)
BUN SERPL-MCNC: 23 MG/DL (ref 5–25)
CALCIUM SERPL-MCNC: 8.3 MG/DL (ref 8.4–10.2)
CHLORIDE SERPL-SCNC: 110 MMOL/L (ref 96–108)
CO2 SERPL-SCNC: 27 MMOL/L (ref 21–32)
CREAT SERPL-MCNC: 1.24 MG/DL (ref 0.6–1.3)
ERYTHROCYTE [DISTWIDTH] IN BLOOD BY AUTOMATED COUNT: 12.5 % (ref 11.6–15.1)
GFR SERPL CREATININE-BSD FRML MDRD: 53 ML/MIN/1.73SQ M
GLUCOSE SERPL-MCNC: 92 MG/DL (ref 65–140)
HCT VFR BLD AUTO: 34.2 % (ref 36.5–49.3)
HGB BLD-MCNC: 11.2 G/DL (ref 12–17)
MAGNESIUM SERPL-MCNC: 2.2 MG/DL (ref 1.9–2.7)
MCH RBC QN AUTO: 31.7 PG (ref 26.8–34.3)
MCHC RBC AUTO-ENTMCNC: 32.7 G/DL (ref 31.4–37.4)
MCV RBC AUTO: 97 FL (ref 82–98)
PLATELET # BLD AUTO: 193 THOUSANDS/UL (ref 149–390)
PMV BLD AUTO: 9.5 FL (ref 8.9–12.7)
POTASSIUM SERPL-SCNC: 4.4 MMOL/L (ref 3.5–5.3)
RBC # BLD AUTO: 3.53 MILLION/UL (ref 3.88–5.62)
SODIUM SERPL-SCNC: 141 MMOL/L (ref 135–147)
WBC # BLD AUTO: 7.13 THOUSAND/UL (ref 4.31–10.16)

## 2024-05-24 PROCEDURE — 85027 COMPLETE CBC AUTOMATED: CPT | Performed by: NURSE PRACTITIONER

## 2024-05-24 PROCEDURE — 83735 ASSAY OF MAGNESIUM: CPT | Performed by: NURSE PRACTITIONER

## 2024-05-24 PROCEDURE — 80048 BASIC METABOLIC PNL TOTAL CA: CPT | Performed by: NURSE PRACTITIONER

## 2024-05-24 PROCEDURE — 99239 HOSP IP/OBS DSCHRG MGMT >30: CPT | Performed by: NURSE PRACTITIONER

## 2024-05-24 PROCEDURE — 99232 SBSQ HOSP IP/OBS MODERATE 35: CPT | Performed by: UROLOGY

## 2024-05-24 RX ORDER — PANTOPRAZOLE SODIUM 20 MG/1
20 TABLET, DELAYED RELEASE ORAL
Qty: 30 TABLET | Refills: 0 | Status: SHIPPED | OUTPATIENT
Start: 2024-05-25

## 2024-05-24 RX ORDER — FINASTERIDE 5 MG/1
5 TABLET, FILM COATED ORAL DAILY
Qty: 30 TABLET | Refills: 0 | Status: SHIPPED | OUTPATIENT
Start: 2024-05-24

## 2024-05-24 RX ADMIN — FAMOTIDINE 20 MG: 20 TABLET, FILM COATED ORAL at 09:20

## 2024-05-24 RX ADMIN — MONTELUKAST 10 MG: 10 TABLET, FILM COATED ORAL at 09:20

## 2024-05-24 RX ADMIN — PANTOPRAZOLE SODIUM 20 MG: 20 TABLET, DELAYED RELEASE ORAL at 06:08

## 2024-05-24 RX ADMIN — LISINOPRIL 10 MG: 10 TABLET ORAL at 09:20

## 2024-05-24 RX ADMIN — BUDESONIDE AND FORMOTEROL FUMARATE DIHYDRATE 2 PUFF: 160; 4.5 AEROSOL RESPIRATORY (INHALATION) at 09:20

## 2024-05-24 RX ADMIN — Medication 1000 UNITS: at 09:20

## 2024-05-24 RX ADMIN — FINASTERIDE 5 MG: 5 TABLET, FILM COATED ORAL at 09:20

## 2024-05-24 RX ADMIN — CEFTRIAXONE SODIUM 1000 MG: 10 INJECTION, POWDER, FOR SOLUTION INTRAVENOUS at 09:22

## 2024-05-24 NOTE — CASE MANAGEMENT
Case Management Discharge Planning Note    Patient name Anthony Nelson  Location /-01 MRN [id number]  : 1940 Date 2024       Current Admission Date: 2024  Current Admission Diagnosis:Gross hematuria   Patient Active Problem List    Diagnosis Date Noted Date Diagnosed    Gross hematuria 2024     GERD (gastroesophageal reflux disease) 2024     Mixed hyperlipidemia 2024     Acute cystitis with hematuria 2024     Elevated prostate specific antigen (PSA) 2023     COPD, group B, by GOLD 2017 classification (HCC) 2023     Coronary artery disease involving native coronary artery of native heart without angina pectoris 2023     Stenosis of right carotid artery 2023       LOS (days): 2  Geometric Mean LOS (GMLOS) (days): 2.9  Days to GMLOS:0.5     OBJECTIVE:  Risk of Unplanned Readmission Score: 7.42         Current admission status: Inpatient   Preferred Pharmacy:   [facility]/pharmacy #[id number] - [place] - [address]  [address]  [place] [address]  Phone: [phone] Fax: [phone]    Primary Care Provider: Michi Lynne MD    Primary Insurance: MEDICARE  Secondary Insurance: [facility]    DISCHARGE DETAILS:  IMM Given (Date):: 24  IMM Given to:: Patient (CM reviewed IMM with patient at bedside. Patient reported understanding their rights and denied any questions or concerns Patient was given a copy and signed copy was placed in medical records.)

## 2024-05-24 NOTE — PLAN OF CARE
Problem: INFECTION - ADULT  Goal: Absence or prevention of progression during hospitalization  Description: INTERVENTIONS:  - Assess and monitor for signs and symptoms of infection  - Monitor lab/diagnostic results  - Monitor all insertion sites, i.e. indwelling lines, tubes, and drains  - Monitor endotracheal if appropriate and nasal secretions for changes in amount and color  - [place] appropriate cooling/warming therapies per order  - Administer medications as ordered  - Instruct and encourage patient and family to use good hand hygiene technique  - Identify and instruct in appropriate isolation precautions for identified infection/condition  Outcome: Progressing

## 2024-05-24 NOTE — TELEPHONE ENCOUNTER
Patient still admitted. Tentatively scheduled patient for TOV in BV on 5/29/24 at 0830 and 2:00pm. Will monitor for DC and confirm.     Patient needs cystoscopy appt scheduled still.

## 2024-05-24 NOTE — TELEPHONE ENCOUNTER
Anthony he is a 83-year-old seen in neurologic consultation due to gross hematuria.  CT showing lobulated appearing hyperdense material within the posterior aspect of lower urinary bladder measuring 5.8 x 2.3 cm. This may represent tumor, or clot material.  Likely clot.  Patient was manually irrigated, started on CBI.  Urine clear yellow.  He was discharged with Roman catheter.  Please schedule nursing visit for trial of void next week 3-5 days.     Patient had elevation in PSA, likely due to catheter placement. Repeat ordered in approximately 1 month.     Please schedule MD follow-up for cystoscopy examination to evaluate hematuria

## 2024-05-24 NOTE — DISCHARGE SUMMARY
Critical access hospital  Discharge- Anthony Fee 1940, 83 y.o. male MRN: [id number]  Unit/Bed#: [facility] Encounter: [id number]  Primary Care Provider: Michi Lynne MD   Date and time admitted to hospital: 5/22/2024 12:50 AM    * Gross hematuria  Assessment & Plan  Painless hematuria  CT scan: Lobulated appearing hyperdense material within the posterior aspect of the lower bladder 5.8 x 2.3 cm.  This may represent a tumor, clot material or combination.  Urology consulted-No need for OR at this time  S/p CBI overnight. Urine light blush. Irrigated with 300 cc clear irrigation. One small clot.   CBI clamped 5/23  Continue to monitor urine output  Hgb down trended to 11 however asymptomatic will send for CBC next week  Patient follows with urologist in [place]. Reports his previous PSA this past year was 6. Elevation can be related to finn catheter placement. Would repeat as outpatient after catheter removed in a few weeks.  Continue proscar to decrease prostate vascularity  Will require office cystoscopy for evaluation of hematuria    Acute cystitis with hematuria  Assessment & Plan  Urine culture shows no growth  Blood cultures show no growth  No indication for further antibiotics at this time    Elevated prostate specific antigen (PSA)  Assessment & Plan  PSA 10.4    COPD, group B, by GOLD 2017 classification (HCC)  Assessment & Plan  Uses inhalers at home.       Discharging Physician / Practitioner: ROCIO Stone  PCP: Michi Lynne MD  Admission Date:   Admission Orders (From admission, onward)       Ordered        05/22/24 0529  INPATIENT ADMISSION  Once                          Discharge Date: 05/24/24    Medical Problems       Resolved Problems  Date Reviewed: 5/22/2024   None         Consultations During Hospital Stay:  IP CONSULT TO UROLOGY    Procedures Performed:   CT abdomen pelvis wo contrast    Result Date: 5/22/2024  There is lobulated appearing  hyperdense material within the posterior aspect of the lower urinary bladder, measuring approximately 5.8 x 2.3 cm. This may represent tumor, clot material, or a combination. Follow-up is required. Urology consultation and follow-up is recommended. The prostate is enlarged. Clinical and laboratory (PSA) correlation recommended. There is a 1.2 cm indeterminate hypodense lesion within the lateral aspect of the interpolar region right kidney. Follow-up is recommended. Nonemergent outpatient ultrasound is suggested as the initial step in evaluating this lesion. If this cannot be proven to represent a simple cyst on ultrasound, renal protocol CT or MRI would be suggested for further characterization, if there are no contraindications. Cholelithiasis. No CT evidence of acute cholecystitis. Colonic diverticulosis without evidence of acute diverticulitis. Atherosclerosis. Coronary artery disease. Other nonemergent findings as above. This examination demonstrates findings for which clinical and imaging follow-up is recommended and was logged as such in EPIC. I personally discussed this study with REINA SANCHEZ on 5/22/2024 4:46 AM. Workstation performed: [id number]         Significant Findings / Test Results:   above    Incidental Findings:   no     Test Results Pending at Discharge (will require follow up):   no     Outpatient Tests Requested:  no    Complications:  none    History reviewed. No pertinent past medical history.    Reason for Admission: Blood in Urine (Pt states bright red blood in urine starting yesterday evening it has not been getting lighter or stopping per pt. No abdominal pain, no N/V )       Hospital Course:     Anthony Nelson is a 83 y.o. male patient with past medical history of above who originally presented to the hospital on 5/22/2024 due to Blood in Urine (Pt states bright red blood in urine starting yesterday evening it has not been getting lighter or stopping per pt. No abdominal pain, no N/V )  patient presented to the ED with blood in his urine was found to have hematuria has a history of elevated PSA and BPH urology was consulted Roman catheter was placed CBI for 24 hours urine cleared up started on Proscar will discharge with Roman understands importance of follow-up denies any additional questions or concerns at this time    Please see above list of diagnoses and related plan for additional information.     Condition at Discharge: stable     Discharge Day Visit / Exam:     Subjective: Denies any chest pain chest tightness shortness of breath difficulty breathing tolerating his meals understands a Roman catheter understands follow-up offers no questions  Vitals: Blood Pressure: 140/76 (05/24/24 0738)  Pulse: 71 (05/24/24 0738)  Temperature: 98.3 °F (36.8 °C) (05/24/24 0738)  Temp Source: Oral (05/23/24 2100)  Respirations: 16 (05/23/24 2100)  Height: 6' (182.9 cm) (05/22/24 0729)  Weight - Scale: 97.6 kg (215 lb 2.7 oz) (05/22/24 0729)  SpO2: 92 % (05/24/24 0738)  Exam:   Physical Exam  Vitals reviewed.   Cardiovascular:      Rate and Rhythm: Normal rate.   Pulmonary:      Effort: No respiratory distress.   Abdominal:      Palpations: Abdomen is soft.   Musculoskeletal:         General: Normal range of motion.   Skin:     General: Skin is warm.      Coloration: Skin is pale.   Neurological:      Mental Status: He is alert. Mental status is at baseline.   Psychiatric:         Mood and Affect: Mood normal.       Discussion with Family: Declined phone call    Discharge instructions/Information to patient and family:   See after visit summary for information provided to patient and family.      Provisions for Follow-Up Care:  See after visit summary for information related to follow-up care and any pertinent home health orders.      Disposition:     Home    Planned Readmission: no     Discharge Statement:  I spent 45 minutes discharging the patient. This time was spent on the day of discharge. I had direct  contact with the patient on the day of discharge. Greater than 50% of the total time was spent examining patient, answering all patient questions, arranging and discussing plan of care with patient as well as directly providing post-discharge instructions.  Additional time then spent on discharge activities.    Discharge Medications:  See after visit summary for reconciled discharge medications provided to patient and family.      ** Please Note: This note has been constructed using a voice recognition system **

## 2024-05-24 NOTE — ASSESSMENT & PLAN NOTE
Urine culture shows no growth  Blood cultures show no growth  No indication for further antibiotics at this time

## 2024-05-24 NOTE — ASSESSMENT & PLAN NOTE
Painless hematuria  CT scan: Lobulated appearing hyperdense material within the posterior aspect of the lower bladder 5.8 x 2.3 cm.  This may represent a tumor, clot material or combination.  Urology consulted-No need for OR at this time  S/p CBI overnight. Urine light blush. Irrigated with 300 cc clear irrigation. One small clot.   CBI clamped 5/23  Continue to monitor urine output  Hgb down trended to 11 however asymptomatic will send for CBC next week  Patient follows with urologist in [place]. Reports his previous PSA this past year was 6. Elevation can be related to finn catheter placement. Would repeat as outpatient after catheter removed in a few weeks.  Continue proscar to decrease prostate vascularity  Will require office cystoscopy for evaluation of hematuria

## 2024-05-24 NOTE — PROGRESS NOTES
Progress Note - Urology  Anthony Nelson 1940, 83 y.o. male MRN: [id number]    Unit/Bed#: [facility] Encounter: [id number]      Assessment & Plan:  Hematuria-resolved  -CT showing lobulated appearing hyperdense material within the posterior aspect of lower urinary bladder measuring 5.8 x 2.3 cm.  This may represent tumor, or clot material.  -Status post CBI, manual irrigation of clots, CBI clamped yesterday.  Urine on rounds clear yellow.  -Hemoglobin 11.2, 14.8 yesterday. Drop not in line with urine color today  -VSS, afebrile  -PSA 10.  Previous baseline around 6 according to patient.  Could be due to Roman catheter placement.  Will repeat in a few weeks after Roman catheter is removed  -Discharged with Roman catheter, trial of void early next week  -Will require cystoscopy examination to evaluate bladder outpatient  -Stable for discharge from urology perspective    Subjective:   HPI: Seen at bedside.  Denies any pain with catheter.  Urine is currently clear yellow.  CBI is clamped since yesterday    Review of Systems   Constitutional:  Negative for chills and fever.   Respiratory:  Negative for cough and shortness of breath.    Cardiovascular:  Negative for chest pain and palpitations.   Gastrointestinal:  Negative for abdominal pain and vomiting.   Genitourinary:  Negative for dysuria and hematuria.   Musculoskeletal:  Negative for arthralgias and back pain.   Skin:  Negative for color change and rash.   Neurological:  Negative for seizures and syncope.   All other systems reviewed and are negative.      Objective:    Vitals: Blood pressure 140/76, pulse 71, temperature 98.3 °F (36.8 °C), resp. rate 16, height 6' (1.829 m), weight 97.6 kg (215 lb 2.7 oz), SpO2 92%.,Body mass index is 29.18 kg/m².    Physical Exam  Constitutional:       General: He is not in acute distress.     Appearance: Normal appearance. He is normal weight. He is not ill-appearing or toxic-appearing.   HENT:      Head: Normocephalic and  atraumatic.      Right Ear: External ear normal.      Left Ear: External ear normal.      Nose: Nose normal.      Mouth/Throat:      Mouth: Mucous membranes are moist.   Eyes:      General: No scleral icterus.     Conjunctiva/sclera: Conjunctivae normal.   Cardiovascular:      Rate and Rhythm: Normal rate.      Pulses: Normal pulses.   Pulmonary:      Effort: Pulmonary effort is normal.   Abdominal:      General: Abdomen is flat. There is no distension.      Tenderness: There is no abdominal tenderness. There is no guarding.   Genitourinary:     Comments: Urine clear yellow  Musculoskeletal:         General: Normal range of motion.      Cervical back: Normal range of motion.   Skin:     General: Skin is warm.      Findings: No rash.   Neurological:      General: No focal deficit present.      Mental Status: He is alert and oriented to person, place, and time. Mental status is at baseline.   Psychiatric:         Mood and Affect: Mood normal.         Behavior: Behavior normal.         Thought Content: Thought content normal.         Judgment: Judgment normal.         Labs:  Recent Labs     05/22/24  0200 05/22/24  0954 05/23/24  0524 05/24/24  0451   WBC 7.33 11.86* 12.34* 7.13       Recent Labs     05/22/24  0200 05/22/24  0954 05/22/24  1948 05/23/24  0524 05/24/24  0451   HGB 13.2 13.0 12.4 14.8 11.2*     Recent Labs     05/22/24  0200 05/22/24  0954 05/22/24  1948 05/23/24  0524 05/24/24  0451   HCT 38.4 38.7 36.5 44.2 34.2*     Recent Labs     05/22/24  0200 05/22/24  0954 05/23/24  0524 05/24/24  0451   CREATININE 1.12 1.10 1.11 1.24       History:  History reviewed. No pertinent past medical history.  Social History     Socioeconomic History    Marital status: /Civil Union     Spouse name: None    Number of children: None    Years of education: None    Highest education level: None   Occupational History    None   Tobacco Use    Smoking status: Never    Smokeless tobacco: Never   Vaping Use    Vaping  status: Never Used   Substance and Sexual Activity    Alcohol use: Not Currently    Drug use: Never    Sexual activity: None   Other Topics Concern    None   Social History Narrative    None     Social Determinants of Health     Financial Resource Strain: Not on file   Food Insecurity: No Food Insecurity (5/23/2024)    Hunger Vital Sign     Worried About Running Out of Food in the Last Year: Never true     Ran Out of Food in the Last Year: Never true   Transportation Needs: No Transportation Needs (5/23/2024)    PRAPARE - Transportation     Lack of Transportation (Medical): No     Lack of Transportation (Non-Medical): No   Physical Activity: Not on file   Stress: Not on file   Social Connections: Not on file   Intimate Partner Violence: Not on file   Housing Stability: Unknown (5/23/2024)    Housing Stability Vital Sign     Unable to Pay for Housing in the Last Year: No     Number of Times Moved in the Last Year: Not on file     Homeless in the Last Year: No     History reviewed. No pertinent surgical history.  Family History   Problem Relation Age of Onset    Heart disease Father     Heart failure Father     Hypertension Father        Marie Lopez PA-C  Date: 5/24/2024 Time: 9:30 AM

## 2024-05-26 LAB
BACTERIA BLD CULT: NORMAL
BACTERIA BLD CULT: NORMAL

## 2024-05-27 LAB
BACTERIA BLD CULT: NORMAL
BACTERIA BLD CULT: NORMAL

## 2024-05-29 ENCOUNTER — PROCEDURE VISIT (OUTPATIENT)
Dept: UROLOGY | Facility: CLINIC | Age: 84
End: 2024-05-29
Payer: MEDICARE

## 2024-05-29 ENCOUNTER — TELEPHONE (OUTPATIENT)
Dept: UROLOGY | Facility: CLINIC | Age: 84
End: 2024-05-29

## 2024-05-29 VITALS
OXYGEN SATURATION: 96 % | HEIGHT: 72 IN | BODY MASS INDEX: 29.12 KG/M2 | TEMPERATURE: 97.5 F | DIASTOLIC BLOOD PRESSURE: 74 MMHG | HEART RATE: 70 BPM | WEIGHT: 215 LBS | SYSTOLIC BLOOD PRESSURE: 138 MMHG

## 2024-05-29 DIAGNOSIS — R31.9 HEMATURIA, UNSPECIFIED TYPE: ICD-10-CM

## 2024-05-29 LAB — POST-VOID RESIDUAL VOLUME, ML POC: 104 ML

## 2024-05-29 PROCEDURE — 51798 US URINE CAPACITY MEASURE: CPT

## 2024-05-29 PROCEDURE — 99211 OFF/OP EST MAY X REQ PHY/QHP: CPT

## 2024-05-29 NOTE — TELEPHONE ENCOUNTER
Directions from d/c:  Please schedule MD follow-up for cystoscopy examination to evaluate hematuria   Patient here today for void trial - suggestion was for ASAP cystoscopy

## 2024-05-29 NOTE — PROGRESS NOTES
5/29/2024    [facility]  1940  [id number]    Diagnosis  Chief Complaint    Gross Hematuria         Patient presents for TOV managed by our office    Plan  Patient to have finn cath removed this morning and then return back to office this afternoon for voiding trial.     Procedure Finn removal/voiding trial    Finn catheter removed after deflation of an intact balloon. Patient tolerated well. Encouraged patient to hydrate well and return this afternoon for post void residual. He knows he may return early if uncomfortable and unable to urinate. Patient agrees to this plan.    Patient returned this afternoon. Patient states able to void. Patient voided while in office. Bladder ultrasound performed and PVR measured 104 ml.    Recent Results (from the past 4 hour(s))   POCT Measure PVR    Collection Time: 05/29/24  2:12 PM   Result Value Ref Range    POST-VOID RESIDUAL VOLUME, ML  mL           Vitals:    05/29/24 0831   BP: 138/74   Pulse: 70   Temp: 97.5 °F (36.4 °C)   TempSrc: Temporal   SpO2: 96%   Weight: 97.5 kg (215 lb)   Height: 6' (1.829 m)     Patient presented this morning to have finn cath removed. 30 mL balloon deflated, upon removal finn cath tip noted to be intact, no trauma noted. Patient denies any pain/discomfort. Patient educated on maintaining adequate hydration throughout the day and urinating every 1-2 hours to encourage emptying the bladder. Patient verbalizes understanding at this time. Patient returned this afternoon stated he maintained adequate hydration and urinated throughout the day. Reports no complications with urination. Patient educated on maintaining hydration and immediately reporting s/s of urinary retention, difficulty, or noticeable blood. Patient verbalizes full understanding to teaching. Patient is scheduled for a Cysto June 5, 2024 to which he is agreeable.     Araceli Wills LPN

## 2024-06-03 ENCOUNTER — TELEPHONE (OUTPATIENT)
Dept: UROLOGY | Facility: CLINIC | Age: 84
End: 2024-06-03

## 2024-06-03 NOTE — TELEPHONE ENCOUNTER
Spoke with patient to make him aware that his PSA is needed prior to upcoming appointment on 6/5/24. Patient stated he will be getting his PSA done tomorrow at [facility] and was thankful for the call as he totally forgot about the needed lab work. Patient also confirmed his appointment.

## 2024-06-05 ENCOUNTER — PROCEDURE VISIT (OUTPATIENT)
Dept: UROLOGY | Facility: CLINIC | Age: 84
End: 2024-06-05
Payer: MEDICARE

## 2024-06-05 VITALS
BODY MASS INDEX: 29.12 KG/M2 | SYSTOLIC BLOOD PRESSURE: 126 MMHG | DIASTOLIC BLOOD PRESSURE: 82 MMHG | HEART RATE: 82 BPM | OXYGEN SATURATION: 96 % | TEMPERATURE: 97.2 F | HEIGHT: 72 IN | WEIGHT: 215 LBS

## 2024-06-05 DIAGNOSIS — R31.9 HEMATURIA, UNSPECIFIED TYPE: Primary | ICD-10-CM

## 2024-06-05 LAB
POST-VOID RESIDUAL VOLUME, ML POC: 126 ML
SL AMB  POCT GLUCOSE, UA: NORMAL
SL AMB LEUKOCYTE ESTERASE,UA: NORMAL
SL AMB POCT BILIRUBIN,UA: NORMAL
SL AMB POCT BLOOD,UA: NORMAL
SL AMB POCT CLARITY,UA: CLEAR
SL AMB POCT COLOR,UA: YELLOW
SL AMB POCT KETONES,UA: NORMAL
SL AMB POCT NITRITE,UA: NORMAL
SL AMB POCT PH,UA: 6
SL AMB POCT SPECIFIC GRAVITY,UA: 1.01
SL AMB POCT URINE PROTEIN: NORMAL
SL AMB POCT UROBILINOGEN: 0.2

## 2024-06-05 PROCEDURE — 81002 URINALYSIS NONAUTO W/O SCOPE: CPT | Performed by: UROLOGY

## 2024-06-05 PROCEDURE — 52000 CYSTOURETHROSCOPY: CPT | Performed by: UROLOGY

## 2024-06-05 PROCEDURE — 51798 US URINE CAPACITY MEASURE: CPT | Performed by: UROLOGY

## 2024-06-05 PROCEDURE — 88112 CYTOPATH CELL ENHANCE TECH: CPT | Performed by: PATHOLOGY

## 2024-06-05 NOTE — PATIENT INSTRUCTIONS
You had cystoscopy done in the office today. This means that we looked inside your urethra and bladder with a camera.    You may see some blood in your urine for the next few days. This is normal. Please drink plenty of fluids. Call the office if you are passing large blood clots in your urine or if you are not able to urinate.    It may burn when you urinate for the next few days. This is normal.    Please call the office if you have fevers or chills in the next few days.    You will return to clinic in 2-3 months

## 2024-06-05 NOTE — PROGRESS NOTES
"83M with     No AC    Admitted late May 2024 with GH. Uro saw as c/s. ED placed 3-way Roman. He passed clamp trial. Started on finasteride while inpt.    Prev saw urologist in [place]. Had prev biopsies negative for cancer. Reported PSA around 6 in Dec 2023.     LUTS: nocturia x2, somewhat weak stream    PSA 10.84 on 5/22/24 (with Roman in place)    CTAP wo 5/22/24: Lobulated appearing hyperdense material within posterior aspect of the bladder measuring about 5.8 cm possibly tumor versus clot material; enlarged prostate gland; 1.2 cm indeterminate hypodense lesion within the lateral aspect of the right interpolar kidney  -Prostate volume personally measured at 5.8 x 6.95 x 7.04 cm, 147.57 g    Passed TOV on 5/29/24    PSA 11.3 on 6/4/24     cc on 6/5/24    U dip -LE, -N, +B on 6/5/24             Cystoscopy     Date/Time  6/5/2024 1:00 PM     Performed by  Jarek Bolaños DO   Authorized by  Jarek Bolaños DO     Universal Protocol:  Consent: Verbal consent obtained. Written consent obtained.  Risks and benefits: risks, benefits and alternatives were discussed  Consent given by: patient  Time out: Immediately prior to procedure a \"time out\" was called to verify the correct patient, procedure, equipment, support staff and site/side marked as required.  Timeout called at: 6/5/2024 2:19 PM.  Patient understanding: patient states understanding of the procedure being performed  Patient consent: the patient's understanding of the procedure matches consent given  Procedure consent: procedure consent matches procedure scheduled  Relevant documents: relevant documents present and verified  Required items: required blood products, implants, devices, and special equipment available  Patient identity confirmed: verbally with patient      Procedure Details:  Procedure type: cystoscopy    Patient tolerance: Patient tolerated the procedure well with no immediate complications    Additional Procedure Details: " Office Cystoscopy Procedure Note    Indication:    Hematuria    Informed consent   The risks, benefits, complications, treatment options, and expected outcomes were discussed with the patient. The patient concurred with the proposed plan and provided informed consent.    Anesthesia  Lidocaine jelly 2%    Antibiotic prophylaxis   None    Procedure  The patient was placed in the supineposition, was prepped and draped in the usual manner using sterile technique, and 2% lidocaine jelly instilled into the urethra.  A 17 F flexible cystoscope was then inserted into the urethra and the urethra and bladder carefully examined.  The following findings were noted:    Findings:  Urethra:  Normal  Prostate:  BL lateral lobe hypertrophy with kissing lobes, intravesical median lobe with significant intravesical component, no lesions  Bladder:  Mod to severe trabeculations; post wall diverticulum; inflammation throughout bladder consistent with recent Roman; no papillary lesions; no stones  Ureteral orifices:  orthotopic  Other findings:  None, retroflexed view confirms    Specimens: None                 Complications:    None; patient tolerated the procedure well           Disposition: To home            Condition: Stable                  PLAN  -Fu u cyto  -CTU for GH eval and also fu for the R renal lesion seen on inpt imaging  -Most recent PSA about 11. PSAD calculated around 0.07. Explained that high PSA most likely from his significantly enlarged prostate. I offered for him to stop checking PSAs versus continuing PSA yearly checks with PCP. He will cont yearly checks for now. He understands that only way to guarantee that he does not have prostate cancer is if we do biopsy, although likelihood of it being positive is overall low. He agreed and does not want biopsy at this time.  -RTC in 2-3 months to review CTU. If neg will just need UA micro in about 1 year.  -Cont finasteride

## 2024-06-07 PROCEDURE — 88112 CYTOPATH CELL ENHANCE TECH: CPT | Performed by: PATHOLOGY

## 2024-06-19 DIAGNOSIS — R31.9 HEMATURIA, UNSPECIFIED TYPE: ICD-10-CM

## 2024-06-19 RX ORDER — FINASTERIDE 5 MG/1
5 TABLET, FILM COATED ORAL DAILY
Qty: 30 TABLET | Refills: 1 | Status: SHIPPED | OUTPATIENT
Start: 2024-06-19

## 2024-06-21 PROBLEM — N30.01 ACUTE CYSTITIS WITH HEMATURIA: Status: RESOLVED | Noted: 2024-05-22 | Resolved: 2024-06-21

## 2024-08-19 DIAGNOSIS — R31.9 HEMATURIA, UNSPECIFIED TYPE: ICD-10-CM

## 2024-08-20 RX ORDER — FINASTERIDE 5 MG/1
5 TABLET, FILM COATED ORAL DAILY
Qty: 30 TABLET | Refills: 5 | Status: SHIPPED | OUTPATIENT
Start: 2024-08-20

## 2024-08-21 ENCOUNTER — TELEPHONE (OUTPATIENT)
Age: 84
End: 2024-08-21

## 2024-08-21 NOTE — TELEPHONE ENCOUNTER
Patient scheduled for CT on 8/26/24, per radiology department patient to obtain older CT results    Provided with 's fax, edie will have his previous urologist fax his last CT with them so that its in his chart for comparison.

## 2024-08-26 ENCOUNTER — HOSPITAL ENCOUNTER (OUTPATIENT)
Dept: CT IMAGING | Facility: HOSPITAL | Age: 84
Discharge: HOME | End: 2024-08-26
Attending: UROLOGY
Payer: MEDICARE

## 2024-08-26 DIAGNOSIS — R31.9 HEMATURIA, UNSPECIFIED TYPE: ICD-10-CM

## 2024-08-26 PROCEDURE — 74178 CT ABD&PLV WO CNTR FLWD CNTR: CPT

## 2024-08-26 RX ADMIN — IOHEXOL 100 ML: 350 INJECTION, SOLUTION INTRAVENOUS at 10:55

## 2024-09-06 RX ORDER — CELECOXIB 200 MG/1
200 CAPSULE ORAL DAILY
COMMUNITY
Start: 2024-06-27

## 2024-09-06 RX ORDER — GABAPENTIN 100 MG/1
CAPSULE ORAL
COMMUNITY
Start: 2024-06-06

## 2024-09-09 ENCOUNTER — OFFICE VISIT (OUTPATIENT)
Dept: UROLOGY | Facility: CLINIC | Age: 84
End: 2024-09-09
Payer: MEDICARE

## 2024-09-09 VITALS
HEIGHT: 72 IN | DIASTOLIC BLOOD PRESSURE: 94 MMHG | HEART RATE: 68 BPM | TEMPERATURE: 97.9 F | BODY MASS INDEX: 29.53 KG/M2 | OXYGEN SATURATION: 98 % | RESPIRATION RATE: 18 BRPM | WEIGHT: 218 LBS | SYSTOLIC BLOOD PRESSURE: 210 MMHG

## 2024-09-09 DIAGNOSIS — N40.1 BENIGN PROSTATIC HYPERPLASIA WITH URINARY RETENTION: Primary | ICD-10-CM

## 2024-09-09 DIAGNOSIS — R31.9 HEMATURIA, UNSPECIFIED TYPE: ICD-10-CM

## 2024-09-09 DIAGNOSIS — R33.8 BENIGN PROSTATIC HYPERPLASIA WITH URINARY RETENTION: Primary | ICD-10-CM

## 2024-09-09 DIAGNOSIS — R97.20 ELEVATED PSA: ICD-10-CM

## 2024-09-09 LAB
BACTERIA UR QL AUTO: ABNORMAL /HPF
BILIRUB UR QL STRIP: NEGATIVE
CLARITY UR: CLEAR
COLOR UR: YELLOW
GLUCOSE UR STRIP-MCNC: NEGATIVE MG/DL
HGB UR QL STRIP.AUTO: ABNORMAL
HYALINE CASTS #/AREA URNS LPF: ABNORMAL /LPF
KETONES UR STRIP-MCNC: NEGATIVE MG/DL
LEUKOCYTE ESTERASE UR QL STRIP: ABNORMAL
MUCOUS THREADS UR QL AUTO: ABNORMAL
NITRITE UR QL STRIP: NEGATIVE
NON-SQ EPI CELLS URNS QL MICRO: ABNORMAL /HPF
PH UR STRIP.AUTO: 7 [PH]
POST-VOID RESIDUAL VOLUME, ML POC: 185 ML
PROT UR STRIP-MCNC: ABNORMAL MG/DL
RBC #/AREA URNS AUTO: ABNORMAL /HPF
SL AMB  POCT GLUCOSE, UA: NORMAL
SL AMB LEUKOCYTE ESTERASE,UA: NORMAL
SL AMB POCT BILIRUBIN,UA: NORMAL
SL AMB POCT BLOOD,UA: NORMAL
SL AMB POCT CLARITY,UA: CLEAR
SL AMB POCT COLOR,UA: YELLOW
SL AMB POCT KETONES,UA: NORMAL
SL AMB POCT NITRITE,UA: NORMAL
SL AMB POCT PH,UA: 6.5
SL AMB POCT SPECIFIC GRAVITY,UA: 1.02
SL AMB POCT URINE PROTEIN: NORMAL
SL AMB POCT UROBILINOGEN: 0.2
SP GR UR STRIP.AUTO: 1.02 (ref 1–1.03)
UROBILINOGEN UR STRIP-ACNC: <2 MG/DL
WBC #/AREA URNS AUTO: ABNORMAL /HPF

## 2024-09-09 PROCEDURE — 51798 US URINE CAPACITY MEASURE: CPT | Performed by: PHYSICIAN ASSISTANT

## 2024-09-09 PROCEDURE — 87086 URINE CULTURE/COLONY COUNT: CPT | Performed by: PHYSICIAN ASSISTANT

## 2024-09-09 PROCEDURE — 99214 OFFICE O/P EST MOD 30 MIN: CPT | Performed by: PHYSICIAN ASSISTANT

## 2024-09-09 PROCEDURE — 81002 URINALYSIS NONAUTO W/O SCOPE: CPT | Performed by: PHYSICIAN ASSISTANT

## 2024-09-09 PROCEDURE — 81001 URINALYSIS AUTO W/SCOPE: CPT | Performed by: PHYSICIAN ASSISTANT

## 2024-09-09 NOTE — PROGRESS NOTES
9/9/2024      Chief Complaint   Patient presents with    Benign Prostatic Hypertrophy    Follow-up         Assessment and Plan    Gross hematuria   - S/p negative cystoscopy on 6/5/24   - Urine cytology (6/5/24) - negative  - CTU (8/26/24) - Previously questioned right renal interpolar 1.2 cm lesion demonstrates no significant enhancement, likely representing mildly complex proteinaceous cyst, a Bosniak 2 lesion.. No suspicious focal renal or urothelial mass lesion. Mild bladder wall thickening with surrounding fat stranding predominantly involving the dome of the bladder. Enlarged prostate   - Urine dip today + blood. Negative nitrites or leuks. Sent out for UA micro and culture.     2. BPH with LUTS  - Continue finasteride  - PVR today elevated at 180 mL.   - Recent imaging negative for hydronephrosis.   - Reviewed addition of alpha blockage for improved voiding which he declines. Recommend timed voiding and double voiding.     3. Right renal lesion  - Bosniak 2 of imaging  - No further surveillance required    4. Elevated PSA  - History of negative prostate biopsies in the past   - PSA 11.3 on 6/4/24. PSAD of 0.07  - Discussed ongoing screening, discontinuation of screening or further work-up with prostate MRI/biopsy. Opting for ongoing yearly prostate cancer screening through PCP     - Follow up in 1 year with UA micro. BP very elevated today, 190/80 on second reading. No symptoms. Recommend reaching out to PCP today, monitoring BP at home and going to ED if remains very elevated.     History of Present Illness  Anthony Nelson is a 84 y.o. male here for follow up evaluation of  gross hematuria    Admitted late May 2024 with . Uro saw as c/s. ED placed 3-way Roman. He passed clamp trial. Started on finasteride while inpt     Prev saw urologist in [place]. Had prev biopsies negative for cancer. Reported PSA around 6 in Dec 2023.      LUTS: nocturia x2, somewhat weak stream.     PSA 10.84 on 5/22/24 (with Roman in  place)    Passed TOV on 5/29/24     Work-up with cystoscopy, CT urogram and urine cytology all negative    Review of Systems   Constitutional:  Negative for chills and fever.   Eyes:  Negative for visual disturbance.   Respiratory:  Negative for shortness of breath.    Cardiovascular:  Negative for chest pain and palpitations.   Gastrointestinal:  Negative for abdominal pain.   Genitourinary:  Negative for difficulty urinating, dysuria, flank pain, frequency, hematuria and urgency.   Neurological:  Negative for dizziness and headaches.           AUA SYMPTOM SCORE      Flowsheet Row Most Recent Value   AUA SYMPTOM SCORE    How often have you had a sensation of not emptying your bladder completely after you finished urinating? 0 (P)     How often have you had to urinate again less than two hours after you finished urinating? 0 (P)     How often have you found you stopped and started again several times when you urinate? 0 (P)     How often have you found it difficult to postpone urination? 2 (P)     How often have you had a weak urinary stream? 2 (P)     How often have you had to push or strain to begin urination? 0 (P)     How many times did you most typically get up to urinate from the time you went to bed at night until the time you got up in the morning? 1 (P)     Quality of Life: If you were to spend the rest of your life with your urinary condition just the way it is now, how would you feel about that? 3 (P)     AUA SYMPTOM SCORE 5 (P)                 Past Medical History  Past Medical History:   Diagnosis Date    Hypertension 2010       Past Social History  Past Surgical History:   Procedure Laterality Date    APPENDECTOMY  1965     Social History     Tobacco Use   Smoking Status Never    Passive exposure: Never   Smokeless Tobacco Never       Past Family History  Family History   Problem Relation Age of Onset    Heart disease Father     Heart failure Father     Hypertension Father     Stroke Father        Past  Social history  Social History     Socioeconomic History    Marital status: /Civil Union     Spouse name: Not on file    Number of children: Not on file    Years of education: Not on file    Highest education level: Not on file   Occupational History    Not on file   Tobacco Use    Smoking status: Never     Passive exposure: Never    Smokeless tobacco: Never   Vaping Use    Vaping status: Never Used   Substance and Sexual Activity    Alcohol use: Not Currently    Drug use: Never    Sexual activity: Yes     Partners: Female   Other Topics Concern    Not on file   Social History Narrative    Not on file     Social Determinants of Health     Financial Resource Strain: Low Risk  (11/29/2023)    Received from [facility]    Financial Resource Strain     Do you have any trouble paying for your medications, or do you think you might in the future?: No     Does your family have trouble paying for medicine? (Household - for ages 0-17 years): Not on file   Food Insecurity: No Food Insecurity (5/23/2024)    Hunger Vital Sign     Worried About Running Out of Food in the Last Year: Never true     Ran Out of Food in the Last Year: Never true   Transportation Needs: No Transportation Needs (5/23/2024)    PRAPARE - Transportation     Lack of Transportation (Medical): No     Lack of Transportation (Non-Medical): No   Physical Activity: Not on file   Stress: Not on file   Social Connections: Socially Integrated (11/29/2023)    Received from [facility]    Social Connections     How often do you feel lonely or isolated from those around you?: Never   Intimate Partner Violence: Not on file   Housing Stability: Unknown (5/23/2024)    Housing Stability Vital Sign     Unable to Pay for Housing in the Last Year: No     Number of Times Moved in the Last Year: Not on file     Homeless in the Last Year: No       Current Medications  Current Outpatient Medications   Medication Sig Dispense Refill    Advair -21 MCG/ACT inhaler        albuterol (ProAir HFA) 90 mcg/act inhaler Inhale 1 puff      albuterol (PROVENTIL HFA,VENTOLIN HFA) 90 mcg/act inhaler       celecoxib (CeleBREX) 200 mg capsule Take 200 mg by mouth daily      famotidine (PEPCID) 20 mg tablet Take 1 tablet by mouth every morning      finasteride (PROSCAR) 5 mg tablet TAKE 1 TABLET (5 MG TOTAL) BY MOUTH DAILY. 30 tablet 5    gabapentin (NEURONTIN) 100 mg capsule       montelukast (SINGULAIR) 10 mg tablet Take 10 mg by mouth daily        pantoprazole (PROTONIX) 20 mg tablet Take 1 tablet (20 mg total) by mouth daily in the early morning 30 tablet 0    ramipril (ALTACE) 10 MG capsule Take 10 mg by mouth daily        rosuvastatin (CRESTOR) 40 MG tablet Take 20 mg by mouth daily       No current facility-administered medications for this visit.       Allergies  Allergies   Allergen Reactions    Bee Pollen Swelling         The following portions of the patient's history were reviewed and updated as appropriate: allergies, current medications, past medical history, past social history, past surgical history and problem list.      Vitals  Vitals:    09/09/24 1047   BP: (!) 210/94   Pulse: 68   Resp: 18   Temp: 97.9 °F (36.6 °C)   TempSrc: Temporal   SpO2: 98%   Weight: 98.9 kg (218 lb)   Height: 6' (1.829 m)           Physical Exam  Physical Exam  Constitutional:       Appearance: Normal appearance.   HENT:      Head: Normocephalic and atraumatic.      Right Ear: External ear normal.      Left Ear: External ear normal.      Nose: Nose normal.   Eyes:      General: No scleral icterus.     Conjunctiva/sclera: Conjunctivae normal.   Cardiovascular:      Pulses: Normal pulses.   Pulmonary:      Effort: Pulmonary effort is normal.   Musculoskeletal:         General: Normal range of motion.      Cervical back: Normal range of motion.   Neurological:      General: No focal deficit present.      Mental Status: He is alert and oriented to person, place, and time.   Psychiatric:         Mood and  Affect: Mood normal.         Behavior: Behavior normal.         Thought Content: Thought content normal.         Judgment: Judgment normal.           Results  Recent Results (from the past 1 hour(s))   POCT urine dip    Collection Time: 09/09/24 10:53 AM   Result Value Ref Range    LEUKOCYTE ESTERASE,UA -     NITRITE,UA -     SL AMB POCT UROBILINOGEN 0.2     POCT URINE PROTEIN +      PH,UA 6.5     BLOOD,UA +++     SPECIFIC GRAVITY,UA 1.025     KETONES,UA +     BILIRUBIN,UA -     GLUCOSE, UA -      COLOR,UA yellow     CLARITY,UA clear    POCT Measure PVR    Collection Time: 09/09/24 10:55 AM   Result Value Ref Range    POST-VOID RESIDUAL VOLUME, ML  mL   ]  Lab Results   Component Value Date    PSA 10.84 (H) 05/22/2024     Lab Results   Component Value Date    CALCIUM 8.3 (L) 05/24/2024    K 4.4 05/24/2024    CO2 27 05/24/2024     (H) 05/24/2024    BUN 23 05/24/2024    CREATININE 1.24 05/24/2024     Lab Results   Component Value Date    WBC 7.13 05/24/2024    HGB 11.2 (L) 05/24/2024    HCT 34.2 (L) 05/24/2024    MCV 97 05/24/2024     05/24/2024           Orders  Orders Placed This Encounter   Procedures    POCT Measure PVR    POCT urine dip       Magy Momin

## 2024-09-10 ENCOUNTER — TELEPHONE (OUTPATIENT)
Dept: UROLOGY | Facility: CLINIC | Age: 84
End: 2024-09-10

## 2024-09-10 LAB — BACTERIA UR CULT: NORMAL

## 2024-09-15 DIAGNOSIS — R31.9 HEMATURIA, UNSPECIFIED TYPE: ICD-10-CM

## 2024-09-16 RX ORDER — FINASTERIDE 5 MG/1
5 TABLET, FILM COATED ORAL DAILY
Qty: 90 TABLET | Refills: 1 | Status: SHIPPED | OUTPATIENT
Start: 2024-09-16

## 2024-11-26 ENCOUNTER — OFFICE VISIT (OUTPATIENT)
Dept: CARDIOLOGY CLINIC | Facility: CLINIC | Age: 84
End: 2024-11-26
Payer: MEDICARE

## 2024-11-26 VITALS
DIASTOLIC BLOOD PRESSURE: 80 MMHG | RESPIRATION RATE: 16 BRPM | OXYGEN SATURATION: 96 % | HEART RATE: 95 BPM | BODY MASS INDEX: 29.26 KG/M2 | SYSTOLIC BLOOD PRESSURE: 158 MMHG | WEIGHT: 216 LBS | HEIGHT: 72 IN

## 2024-11-26 DIAGNOSIS — E78.2 MIXED HYPERLIPIDEMIA: ICD-10-CM

## 2024-11-26 DIAGNOSIS — I25.10 CORONARY ARTERY DISEASE INVOLVING NATIVE CORONARY ARTERY OF NATIVE HEART WITHOUT ANGINA PECTORIS: Primary | ICD-10-CM

## 2024-11-26 DIAGNOSIS — I65.21 STENOSIS OF RIGHT CAROTID ARTERY: ICD-10-CM

## 2024-11-26 DIAGNOSIS — I48.91 ATRIAL FIBRILLATION, UNSPECIFIED TYPE (HCC): ICD-10-CM

## 2024-11-26 DIAGNOSIS — I10 PRIMARY HYPERTENSION: ICD-10-CM

## 2024-11-26 PROCEDURE — 93000 ELECTROCARDIOGRAM COMPLETE: CPT | Performed by: INTERNAL MEDICINE

## 2024-11-26 PROCEDURE — 99214 OFFICE O/P EST MOD 30 MIN: CPT | Performed by: INTERNAL MEDICINE

## 2024-11-26 RX ORDER — CYCLOSPORINE 0.5 MG/ML
EMULSION OPHTHALMIC
COMMUNITY
Start: 2024-09-17

## 2024-11-26 NOTE — PROGRESS NOTES
Cardiology Follow Up    Anthony Nelson  1940  [id number]  [facility] [place] [id number] [phone]    1. Coronary artery disease involving native coronary artery of native heart without angina pectoris  2. Stenosis of right carotid artery  3. Mixed hyperlipidemia  4. Primary hypertension  5.  Abnormal EKG.  6.  Atrial fibrillation, new diagnosis, uncertain duration, rate well-controlled and asymptomatic, [id number] 4.  7. Chest pain    Plan:   He has atrial fib and elevated chads vasc 4.   Start eliquis 5 mg bid.   Given symptoms, new afib, abnormal EKG will check echo and nuc stress.  Further management based on results.  All questions answered.      Interval History: 83-year-old retired  who has known carotid plaque with maximal stenosis of 50 to 69% in the right carotid artery and less than 50% in the left carotid artery. He carries a chart diagnosis of CAD but denies any clinical history.     He comes in for routine cardiology fu, previously seen by Dr. Nevarez. He  is physically active and has not had exertional related discomfort.  He walks and golfs with no symptoms.  He walked 10 miles last week. HE does get an ocassional L sided gassy chest pain at rest, lasts seconds and resolves.     He does not smoke cigarettes.  He is on Crestor 40 mg daily and has an LDL cholesterol 56 12/2021.    His dad had afib and a cva.     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  A-fib is a new diagnosis.  Chads Vasc score is 4.         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)    GERD (gastroesophageal reflux disease)    Elevated prostate specific antigen (PSA)    Mixed  hyperlipidemia    Primary hypertension     Past Medical History:   Diagnosis Date    Hypertension 2010     Social History     Socioeconomic History    Marital status: /Civil Union     Spouse name: Not on file    Number of children: Not on file    Years of education: Not on file    Highest education level: Not on file   Occupational History    Not on file   Tobacco Use    Smoking status: Never     Passive exposure: Never    Smokeless tobacco: Never   Vaping Use    Vaping status: Never Used   Substance and Sexual Activity    Alcohol use: Not Currently    Drug use: Never    Sexual activity: Yes     Partners: Female   Other Topics Concern    Not on file   Social History Narrative    Not on file     Social Drivers of Health     Financial Resource Strain: Low Risk  (11/29/2023)    Received from [employer]    Financial Resource Strain     Do you have any trouble paying for your medications, or do you think you might in the future?: No     Does your family have trouble paying for medicine? (Household - for ages 0-17 years): Not on file   Food Insecurity: No Food Insecurity (5/23/2024)    Nursing - Inadequate Food Risk Classification     Worried About Running Out of Food in the Last Year: Never true     Ran Out of Food in the Last Year: Never true     Ran Out of Food in the Last Year: Not on file   Transportation Needs: No Transportation Needs (5/23/2024)    PRAPARE - Transportation     Lack of Transportation (Medical): No     Lack of Transportation (Non-Medical): No   Physical Activity: Not on file   Stress: Not on file   Social Connections: Socially Integrated (11/29/2023)    Received from [employer]    Social Connections     How often do you feel lonely or isolated from those around you?: Never   Intimate Partner Violence: Not on file   Housing Stability: Unknown (5/23/2024)    Housing Stability Vital Sign     Unable to Pay for Housing in the Last Year: No     Number of Times Moved in the Last Year: Not on file      Homeless in the Last Year: No      Family History   Problem Relation Age of Onset    Heart disease Father     Heart failure Father     Hypertension Father     Stroke Father      Past Surgical History:   Procedure Laterality Date    APPENDECTOMY  1965       Current Outpatient Medications:     Advair -21 MCG/ACT inhaler, , Disp: , Rfl:     albuterol (ProAir HFA) 90 mcg/act inhaler, Inhale 1 puff, Disp: , Rfl:     albuterol (PROVENTIL HFA,VENTOLIN HFA) 90 mcg/act inhaler, , Disp: , Rfl:     celecoxib (CeleBREX) 200 mg capsule, Take 200 mg by mouth daily, Disp: , Rfl:     famotidine (PEPCID) 20 mg tablet, Take 1 tablet by mouth every morning, Disp: , Rfl:     finasteride (PROSCAR) 5 mg tablet, TAKE 1 TABLET (5 MG TOTAL) BY MOUTH DAILY., Disp: 90 tablet, Rfl: 1    gabapentin (NEURONTIN) 100 mg capsule, , Disp: , Rfl:     montelukast (SINGULAIR) 10 mg tablet, Take 10 mg by mouth daily  , Disp: , Rfl:     pantoprazole (PROTONIX) 20 mg tablet, Take 1 tablet (20 mg total) by mouth daily in the early morning, Disp: 30 tablet, Rfl: 0    ramipril (ALTACE) 10 MG capsule, Take 10 mg by mouth daily  , Disp: , Rfl:     rosuvastatin (CRESTOR) 40 MG tablet, Take 20 mg by mouth daily, Disp: , Rfl:   Allergies   Allergen Reactions    Bee Pollen Swelling       Labs:  No visits with results within 2 Month(s) from this visit.   Latest known visit with results is:   Office Visit on 09/09/2024   Component Date Value    POST-VOID RESIDUAL VOLUM* 09/09/2024 185     LEUKOCYTE ESTERASE,UA 09/09/2024 -     NITRITE,UA 09/09/2024 -     SL AMB POCT UROBILINOGEN 09/09/2024 0.2     POCT URINE PROTEIN 09/09/2024 +      PH,UA 09/09/2024 6.5     BLOOD,UA 09/09/2024 +++     SPECIFIC GRAVITY,UA 09/09/2024 1.025     KETONES,UA 09/09/2024 +     BILIRUBIN,UA 09/09/2024 -     GLUCOSE, UA 09/09/2024 -      COLOR,UA 09/09/2024 yellow     CLARITY,UA 09/09/2024 clear     Urine Culture 09/09/2024 No Growth <1000 cfu/mL     Color, UA 09/09/2024 Yellow      Clarity, UA 09/09/2024 Clear     Specific Gravity, UA 09/09/2024 1.018     pH, UA 09/09/2024 7.0     Leukocytes, UA 09/09/2024 Small (A)     Nitrite, UA 09/09/2024 Negative     Protein, UA 09/09/2024 100 (2+) (A)     Glucose, UA 09/09/2024 Negative     Ketones, UA 09/09/2024 Negative     Urobilinogen, UA 09/09/2024 <2.0     Bilirubin, UA 09/09/2024 Negative     Occult Blood, UA 09/09/2024 Moderate (A)     RBC, UA 09/09/2024 Innumerable (A)     WBC, UA 09/09/2024 10-20 (A)     Epithelial Cells 09/09/2024 None Seen     Bacteria, UA 09/09/2024 None Seen     MUCUS THREADS 09/09/2024 Occasional (A)     Hyaline Casts, UA 09/09/2024 0-3 (A)      Imaging: No results found.    Review of Systems:  Review of Systems negative    Physical Exam:  150/76.  Heart rate 72 and regular.  No carotid bruits.  Regular rhythm.  No murmurs or gallops.  Lungs clear.  No edema.    Discussion/Summary:    1.  Coronary artery disease equivalent without angina pectoris  2.  Asymptomatic carotid artery disease    Recommendations:    1.  Continue medications including aspirin  2.  If severe discomfort in the front of the chest or in the back between the jaw and abdomen seek prompt medical attention  3.  Return in 1 year for follow-up      Cali Ashford MD

## 2024-11-29 ENCOUNTER — OFFICE VISIT (OUTPATIENT)
Dept: URGENT CARE | Facility: CLINIC | Age: 84
End: 2024-11-29
Payer: MEDICARE

## 2024-11-29 ENCOUNTER — TELEPHONE (OUTPATIENT)
Age: 84
End: 2024-11-29

## 2024-11-29 VITALS
RESPIRATION RATE: 18 BRPM | HEART RATE: 72 BPM | DIASTOLIC BLOOD PRESSURE: 74 MMHG | TEMPERATURE: 97 F | SYSTOLIC BLOOD PRESSURE: 180 MMHG | OXYGEN SATURATION: 97 %

## 2024-11-29 DIAGNOSIS — R31.9 HEMATURIA, UNSPECIFIED TYPE: Primary | ICD-10-CM

## 2024-11-29 LAB
SL AMB  POCT GLUCOSE, UA: ABNORMAL
SL AMB LEUKOCYTE ESTERASE,UA: ABNORMAL
SL AMB POCT BILIRUBIN,UA: ABNORMAL
SL AMB POCT BLOOD,UA: ABNORMAL
SL AMB POCT CLARITY,UA: ABNORMAL
SL AMB POCT COLOR,UA: YELLOW
SL AMB POCT KETONES,UA: ABNORMAL
SL AMB POCT NITRITE,UA: ABNORMAL
SL AMB POCT PH,UA: 7
SL AMB POCT SPECIFIC GRAVITY,UA: 1.01
SL AMB POCT URINE PROTEIN: ABNORMAL
SL AMB POCT UROBILINOGEN: 0.2

## 2024-11-29 PROCEDURE — 99213 OFFICE O/P EST LOW 20 MIN: CPT | Performed by: NURSE PRACTITIONER

## 2024-11-29 PROCEDURE — 87086 URINE CULTURE/COLONY COUNT: CPT | Performed by: NURSE PRACTITIONER

## 2024-11-29 PROCEDURE — 81002 URINALYSIS NONAUTO W/O SCOPE: CPT | Performed by: NURSE PRACTITIONER

## 2024-11-29 PROCEDURE — G0463 HOSPITAL OUTPT CLINIC VISIT: HCPCS | Performed by: NURSE PRACTITIONER

## 2024-11-29 NOTE — TELEPHONE ENCOUNTER
Caller: Patient      Reason For Call:    Pt started apixaban (Eliquis) 5 mg wednesday and realized that he had blood in his urine. He went to one of our clinics and they ruled out any infections. He stopped taking the medication and his pee is clear now.      Last medication intake was Wednesday 11/27 only took 2 pills. Patient doesn't know if he should start his medication again. Can you please advise and give him a call back. Thanks          Phone: [phone] (Mobile)

## 2024-11-29 NOTE — PROGRESS NOTES
[facility]        NAME: Anthony Nelson is a 84 y.o. male  : 1940    MRN: [id number]  DATE: 2024  TIME: 8:37 AM    Assessment and Plan   Hematuria, unspecified type [R31.9]  1. Hematuria, unspecified type  POCT urine dip    Urine culture        Urine dip positive for large blood, no signs of infection. Will send out culture. Advised to follow up with urology. Patient had stopped taking his eliquis when he noticed blood last night. Advised to call cardiology for further instructions, as I informed him he should not stop this without their instructions. Currently asymptomatic. BP elevated in office, however patient had not taken his bp meds today. Instructed to take this when he gets home.     Patient Instructions       Follow up with PCP in 3-5 days.  Proceed to  ER if symptoms worsen.    If tests are performed, our office will contact you with results only if changes need to made to the care plan discussed with you at the visit. [facility]    Chief Complaint     Chief Complaint   Patient presents with    Blood in Urine     Patient here with complaints of blood in urine since yesterday. He states it is improved this morning, but he is here to get it checked out. He states he is on blood thinners (eliquis) for a-fib and has had prostate issues in the past which caused blood in urine as well. He stopped the eliquis due to being nervous about the blood in his urine.          History of Present Illness       May of this year had blood in the urine, admitted to the hospital and states prostate was inflamed during this time.     Blood in Urine  This is a new problem. The current episode started yesterday. The problem has been gradually improving since onset. He describes the hematuria as microscopic hematuria. Hematuria confirmed by urinalysis. The hematuria occurs during the initial portion of his urinary stream. He reports no clotting in his urine  stream. He is experiencing no pain. Irritative symptoms do not include frequency. Obstructive symptoms do not include incomplete emptying, an intermittent stream or straining. Pertinent negatives include no abdominal pain, chills, dysuria, fever, flank pain, genital pain, inability to urinate, nausea, urinary retention or vomiting. Risk factors include anticoagulant.       Review of Systems   Review of Systems   Constitutional:  Negative for chills, diaphoresis, fatigue and fever.   Respiratory:  Negative for chest tightness and shortness of breath.    Cardiovascular:  Negative for chest pain, palpitations and leg swelling.   Gastrointestinal:  Negative for abdominal pain, constipation, diarrhea, nausea and vomiting.   Genitourinary:  Positive for hematuria. Negative for difficulty urinating, dysuria, flank pain, frequency and incomplete emptying.   Musculoskeletal:  Positive for back pain (muscular).   Neurological:  Negative for dizziness and weakness.         Current Medications       Current Outpatient Medications:     Advair -21 MCG/ACT inhaler, , Disp: , Rfl:     albuterol (ProAir HFA) 90 mcg/act inhaler, Inhale 1 puff, Disp: , Rfl:     albuterol (PROVENTIL HFA,VENTOLIN HFA) 90 mcg/act inhaler, , Disp: , Rfl:     apixaban (Eliquis) 5 mg, Take 1 tablet (5 mg total) by mouth 2 (two) times a day, Disp: 180 tablet, Rfl: 2    celecoxib (CeleBREX) 200 mg capsule, Take 200 mg by mouth daily, Disp: , Rfl:     cycloSPORINE (RESTASIS) 0.05 % ophthalmic emulsion, instill 1 drop into both eyes twice a day, Disp: , Rfl:     famotidine (PEPCID) 20 mg tablet, Take 1 tablet by mouth every morning, Disp: , Rfl:     finasteride (PROSCAR) 5 mg tablet, TAKE 1 TABLET (5 MG TOTAL) BY MOUTH DAILY., Disp: 90 tablet, Rfl: 1    gabapentin (NEURONTIN) 100 mg capsule, , Disp: , Rfl:     montelukast (SINGULAIR) 10 mg tablet, Take 10 mg by mouth daily  , Disp: , Rfl:     ramipril (ALTACE) 10 MG capsule, Take 10 mg by mouth daily  ,  Disp: , Rfl:     rosuvastatin (CRESTOR) 40 MG tablet, Take 20 mg by mouth daily, Disp: , Rfl:     pantoprazole (PROTONIX) 20 mg tablet, Take 1 tablet (20 mg total) by mouth daily in the early morning, Disp: 30 tablet, Rfl: 0    Current Allergies     Allergies as of 11/29/2024 - Reviewed 11/29/2024   Allergen Reaction Noted    Bee pollen Swelling 05/24/2022            The following portions of the patient's history were reviewed and updated as appropriate: allergies, current medications, past family history, past medical history, past social history, past surgical history and problem list.     Past Medical History:   Diagnosis Date    A-fib (HCC) 11/26/2024    Hypertension 2010       Past Surgical History:   Procedure Laterality Date    APPENDECTOMY  1965       Family History   Problem Relation Age of Onset    Heart disease Father     Heart failure Father     Hypertension Father     Stroke Father          Medications have been verified.        Objective   BP (!) 180/74 Comment: manual recheck.  Pulse 72   Temp (!) 97 °F (36.1 °C)   Resp 18   SpO2 97%        Physical Exam     Physical Exam  Vitals and nursing note reviewed.   Constitutional:       General: He is not in acute distress.     Appearance: Normal appearance. He is not ill-appearing.   HENT:      Head: Normocephalic and atraumatic.   Cardiovascular:      Rate and Rhythm: Normal rate and regular rhythm.      Heart sounds: Normal heart sounds, S1 normal and S2 normal.   Pulmonary:      Effort: Pulmonary effort is normal. No accessory muscle usage.      Breath sounds: Normal breath sounds. No wheezing.   Abdominal:      Tenderness: There is no abdominal tenderness. There is no right CVA tenderness or left CVA tenderness.   Skin:     General: Skin is warm and dry.      Capillary Refill: Capillary refill takes less than 2 seconds.      Findings: No rash.   Neurological:      General: No focal deficit present.      Mental Status: He is alert and oriented to  person, place, and time.      Motor: Motor function is intact.   Psychiatric:         Attention and Perception: Attention and perception normal.         Mood and Affect: Mood and affect normal.         Speech: Speech normal.         Behavior: Behavior normal. Behavior is cooperative.         Thought Content: Thought content normal.

## 2024-11-30 LAB — BACTERIA UR CULT: NORMAL

## 2024-12-04 NOTE — TELEPHONE ENCOUNTER
Called pt and relayed 's message. Pt verbalized understanding.  Pt reports he has an appt scheduled to see his PCP 12/12/24.

## 2024-12-05 ENCOUNTER — TELEPHONE (OUTPATIENT)
Age: 84
End: 2024-12-05

## 2024-12-05 NOTE — TELEPHONE ENCOUNTER
Spoke with pt the office fax # was given [phone]/    Pt is still asking about the dye from the test regarding his kidney function.     Please advise

## 2024-12-05 NOTE — TELEPHONE ENCOUNTER
Caller: Adi Nelson    Doctor: Dr. Ashford    Call back #: [phone]    Reason for call: Patient is having lab work completed today by [facility] and patient asked for the fax number to [facility] so that his cardiologist could review the blood work results prior to his Echo and Stress tests scheduled for 12/10/24. Patient wants to know if the dye that they are going to use for this test will be okay to use with his kidney function. Please advise patient. Thank you.

## 2024-12-09 ENCOUNTER — TELEPHONE (OUTPATIENT)
Age: 84
End: 2024-12-09

## 2024-12-09 NOTE — TELEPHONE ENCOUNTER
Pt called and stated he was seen in the  Urgent Care on 11/29 because he was experiencing blood in his urine that has since resolved. He had some urine testing completed that came back abnormal and would like for Magy Momin or  to review them and contact him directly with any recommendations they may have.     Call back: [phone]

## 2024-12-10 ENCOUNTER — HOSPITAL ENCOUNTER (OUTPATIENT)
Dept: NON INVASIVE DIAGNOSTICS | Facility: CLINIC | Age: 84
Discharge: HOME | End: 2024-12-10
Payer: MEDICARE

## 2024-12-10 ENCOUNTER — RESULTS FOLLOW-UP (OUTPATIENT)
Dept: CARDIOLOGY CLINIC | Facility: CLINIC | Age: 84
End: 2024-12-10

## 2024-12-10 VITALS
DIASTOLIC BLOOD PRESSURE: 74 MMHG | SYSTOLIC BLOOD PRESSURE: 180 MMHG | HEIGHT: 72 IN | HEART RATE: 75 BPM | BODY MASS INDEX: 29.26 KG/M2 | WEIGHT: 216 LBS

## 2024-12-10 VITALS
SYSTOLIC BLOOD PRESSURE: 208 MMHG | HEART RATE: 61 BPM | BODY MASS INDEX: 29.26 KG/M2 | DIASTOLIC BLOOD PRESSURE: 86 MMHG | WEIGHT: 216 LBS | HEIGHT: 72 IN | OXYGEN SATURATION: 99 %

## 2024-12-10 DIAGNOSIS — I48.91 ATRIAL FIBRILLATION, UNSPECIFIED TYPE (HCC): ICD-10-CM

## 2024-12-10 DIAGNOSIS — I25.10 CORONARY ARTERY DISEASE INVOLVING NATIVE CORONARY ARTERY OF NATIVE HEART WITHOUT ANGINA PECTORIS: ICD-10-CM

## 2024-12-10 LAB
AORTIC ROOT: 3.6 CM
APICAL FOUR CHAMBER EJECTION FRACTION: 51 %
ASCENDING AORTA: 4.1 CM
AV REGURGITATION PRESSURE HALF TIME: 501 MS
E WAVE DECELERATION TIME: 250 MS
E/A RATIO: 1.23
FRACTIONAL SHORTENING: 39 (ref 28–44)
INTERVENTRICULAR SEPTUM IN DIASTOLE (PARASTERNAL SHORT AXIS VIEW): 0.8 CM
INTERVENTRICULAR SEPTUM: 0.8 CM (ref 0.6–1.1)
LAAS-AP2: 26.1 CM2
LAAS-AP4: 16.3 CM2
LEFT ATRIUM SIZE: 4.9 CM
LEFT ATRIUM VOLUME (MOD BIPLANE): 62 ML
LEFT ATRIUM VOLUME INDEX (MOD BIPLANE): 28.2 ML/M2
LEFT INTERNAL DIMENSION IN SYSTOLE: 3.3 CM (ref 2.1–4)
LEFT VENTRICULAR INTERNAL DIMENSION IN DIASTOLE: 5.4 CM (ref 3.5–6)
LEFT VENTRICULAR POSTERIOR WALL IN END DIASTOLE: 1 CM
LEFT VENTRICULAR STROKE VOLUME: 96 ML
LVSV (TEICH): 96 ML
MV E'TISSUE VEL-SEP: 8 CM/S
MV PEAK A VEL: 0.64 M/S
MV PEAK E VEL: 79 CM/S
MV STENOSIS PRESSURE HALF TIME: 73 MS
MV VALVE AREA P 1/2 METHOD: 3.01
RATE PRESSURE PRODUCT: NORMAL
RIGHT ATRIUM AREA SYSTOLE A4C: 16.4 CM2
RIGHT VENTRICLE ID DIMENSION: 3.8 CM
SINOTUBULAR JUNCTION: 3 CM
SL CV AV DECELERATION TIME RETROGRADE: 1727 MS
SL CV AV PEAK GRADIENT RETROGRADE: 48 MMHG
SL CV LEFT ATRIUM LENGTH A2C: 5.9 CM
SL CV LV EF: 52
SL CV PED ECHO LEFT VENTRICLE DIASTOLIC VOLUME (MOD BIPLANE) 2D: 140 ML
SL CV PED ECHO LEFT VENTRICLE SYSTOLIC VOLUME (MOD BIPLANE) 2D: 44 ML
SL CV REST NUCLEAR ISOTOPE DOSE: 10.94 MCI
SL CV SINUS OF VALSALVA 2D: 4 CM
SL CV STRESS NUCLEAR ISOTOPE DOSE: 32.5 MCI
SL CV STRESS RECOVERY BP: NORMAL MMHG
SL CV STRESS RECOVERY HR: 75 BPM
SL CV STRESS RECOVERY O2 SAT: 100 %
STJ: 3 CM
STRESS ANGINA INDEX: 0
STRESS BASELINE BP: NORMAL MMHG
STRESS BASELINE HR: 61 BPM
STRESS O2 SAT REST: 99 %
STRESS PEAK HR: 105 BPM
STRESS POST O2 SAT PEAK: 100 %
STRESS POST PEAK BP: 178 MMHG
TRANSVERSE AORTIC ARCH: 3.96 CM
TRICUSPID ANNULAR PLANE SYSTOLIC EXCURSION: 2 CM

## 2024-12-10 PROCEDURE — 93016 CV STRESS TEST SUPVJ ONLY: CPT | Performed by: INTERNAL MEDICINE

## 2024-12-10 PROCEDURE — 78452 HT MUSCLE IMAGE SPECT MULT: CPT | Performed by: INTERNAL MEDICINE

## 2024-12-10 PROCEDURE — 93018 CV STRESS TEST I&R ONLY: CPT | Performed by: INTERNAL MEDICINE

## 2024-12-10 PROCEDURE — A9502 TC99M TETROFOSMIN: HCPCS

## 2024-12-10 PROCEDURE — 93306 TTE W/DOPPLER COMPLETE: CPT | Performed by: INTERNAL MEDICINE

## 2024-12-10 PROCEDURE — 93017 CV STRESS TEST TRACING ONLY: CPT

## 2024-12-10 PROCEDURE — 93306 TTE W/DOPPLER COMPLETE: CPT

## 2024-12-10 PROCEDURE — 78452 HT MUSCLE IMAGE SPECT MULT: CPT

## 2024-12-10 RX ORDER — REGADENOSON 0.08 MG/ML
0.4 INJECTION, SOLUTION INTRAVENOUS ONCE
Status: COMPLETED | OUTPATIENT
Start: 2024-12-10 | End: 2024-12-10

## 2024-12-10 RX ADMIN — REGADENOSON 0.4 MG: 0.08 INJECTION, SOLUTION INTRAVENOUS at 11:58

## 2024-12-10 NOTE — TELEPHONE ENCOUNTER
Urine culture was negative for UTI. As long as hematuria resolved can follow up as previously scheduled. He had negative work-up earlier this year.

## 2024-12-10 NOTE — TELEPHONE ENCOUNTER
Spoke to pt and made him aware of negative test results. Pt denied seeing any visible blood in his urine, says he just started eliquis and thinks that's why he saw some blood before. Advised pt to call office if he starts to see blood again, pt verbalized understanding. No further complaints from pt.

## 2024-12-11 LAB
CHEST PAIN STATEMENT: NORMAL
MAX DIASTOLIC BP: 80 MMHG
MAX PREDICTED HEART RATE: 136 BPM
PROTOCOL NAME: NORMAL
REASON FOR TERMINATION: NORMAL
STRESS POST EXERCISE DUR MIN: 3 MIN
STRESS POST EXERCISE DUR SEC: 0 SEC
STRESS POST PEAK HR: 105 BPM
STRESS POST PEAK SYSTOLIC BP: 210 MMHG
TARGET HR FORMULA: NORMAL

## 2024-12-12 ENCOUNTER — RESULTS FOLLOW-UP (OUTPATIENT)
Dept: CARDIOLOGY CLINIC | Facility: CLINIC | Age: 84
End: 2024-12-12

## 2025-01-08 ENCOUNTER — TELEPHONE (OUTPATIENT)
Dept: NEPHROLOGY | Facility: CLINIC | Age: 85
End: 2025-01-08

## 2025-01-08 DIAGNOSIS — R39.9 UTI SYMPTOMS: Primary | ICD-10-CM

## 2025-01-08 NOTE — TELEPHONE ENCOUNTER
Patient stopped by the office stated that he will be going to [place] for  3 or 4 months and wanted to know if he was able to get copies of his two last office notes and lab work. Patient signed a Medical Release Information scanned into chart. Copy was given to patient.    Patient also was asking that  he went to the Urgent Care around Thanksgiving due to patient had blood in urine. Patient stated ever since he is taking Eliquis 5 mg he has sometimes blood in urine and he doesn't understand what is going.    If you have any questions Please contact patient @ [phone] he will leaving to [place] in two weeks for the winter.    Thank you

## 2025-01-09 NOTE — TELEPHONE ENCOUNTER
Office notes were already given , .   He is asking about the fact that he went to the Urgent Care around Thanksgiving due to blood in urine. Patient stated ever since he is taking Eliquis 5 mg he has sometimes blood in urine and he doesn't understand what is going.

## 2025-01-09 NOTE — TELEPHONE ENCOUNTER
He had negative hematuria work-up within the past 6 months. Can obtain urine testing. He is on finasteride which can help to reduce any potential bleeding from the prostate. ED precautions for any sustained hematuria with clots. Increase hydration. If ongoing issues with hematuria, may need updated imaging

## 2025-01-13 NOTE — TELEPHONE ENCOUNTER
Spoke with pt. He stated that there is still a little bit of blood in the urine, denies any clots. He says the hematuria comes and goes. ER precautions reviewed.     Urine testing orders placed. Pt stated he needs a paper script for [facility], he will stop by the bv office tomorrow to pick these up.

## 2025-01-16 ENCOUNTER — RESULTS FOLLOW-UP (OUTPATIENT)
Dept: UROLOGY | Facility: CLINIC | Age: 85
End: 2025-01-16

## 2025-01-16 LAB
BACTERIA UR CULT: NORMAL
Lab: NO GROWTH

## 2025-01-16 NOTE — TELEPHONE ENCOUNTER
Called and spoke to the PT with results. PT verbalized understanding and was thankful for the call. PT also stated will be in [place] for 3 months and if PT starts to bleed again will see a  In [place].    ----- Message from Magy Momin PA-C sent at 1/16/2025  9:32 AM EST -----  Urine culture negative for UTI

## 2025-05-05 ENCOUNTER — TELEPHONE (OUTPATIENT)
Age: 85
End: 2025-05-05

## 2025-05-05 NOTE — TELEPHONE ENCOUNTER
Patient seen by Magy at [facility]    Patient called stating he has been having blood in the urine off and on every 2 weeks for one day and then it clears out.  He wants to see Dr. Bolaños .  He is ok seen the AP and see what the plan of care will be.     He scheduled appointment     05/13/25 at 720 am with Renetta at [facility]

## 2025-05-12 ENCOUNTER — OFFICE VISIT (OUTPATIENT)
Dept: CARDIOLOGY CLINIC | Facility: CLINIC | Age: 85
End: 2025-05-12
Payer: COMMERCIAL

## 2025-05-12 VITALS
BODY MASS INDEX: 28.99 KG/M2 | RESPIRATION RATE: 16 BRPM | HEART RATE: 88 BPM | WEIGHT: 214 LBS | HEIGHT: 72 IN | SYSTOLIC BLOOD PRESSURE: 140 MMHG | DIASTOLIC BLOOD PRESSURE: 88 MMHG | OXYGEN SATURATION: 98 %

## 2025-05-12 DIAGNOSIS — E78.2 MIXED HYPERLIPIDEMIA: ICD-10-CM

## 2025-05-12 DIAGNOSIS — I25.10 CORONARY ARTERY DISEASE INVOLVING NATIVE CORONARY ARTERY OF NATIVE HEART WITHOUT ANGINA PECTORIS: Primary | ICD-10-CM

## 2025-05-12 DIAGNOSIS — J44.9 COPD, GROUP B, BY GOLD 2017 CLASSIFICATION (HCC): Chronic | ICD-10-CM

## 2025-05-12 PROCEDURE — 99214 OFFICE O/P EST MOD 30 MIN: CPT | Performed by: INTERNAL MEDICINE

## 2025-05-12 RX ORDER — AMLODIPINE BESYLATE 2.5 MG/1
2.5 TABLET ORAL EVERY MORNING
COMMUNITY

## 2025-05-12 NOTE — PROGRESS NOTES
Cardiology Follow Up    Anthony Nelson  1940  [id number]  [facility] [place] [id number] [phone]    1. Coronary artery disease involving native coronary artery of native heart without angina pectoris  2. 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 Mixed hyperlipidemia  5.  Abnormal EKG.  6.  Atrial fibrillation, new diagnosis, uncertain duration, rate well-controlled and asymptomatic, [id number] 4.  7. Chest pain    Plan:   He has atrial fib and elevated chads vasc 4.   Continue eliquis 5 mg bid.   He does have some hematuria and is seeing urology in am.   We discussed watchman which he will consider.   Echo and nuc stress test reviewed w pt.     He has an elevated chads Vasc score of  4 and elevated hasbled score of 2 with ongoing hematuria..  ...  He has a sensible reason to get off oral anticoagulation.  We discussed that most clots from atrial fibrillation causing strokes originate in the left atrial appendage.  We discussed the rationale for the Watchman procedure which closes the left atrial appendage and then becomes endothelialized which allows patients to stop long-term oral anticoagulation.     We discussed the Watchman trials (eg PROTECT AF, PREVAIL, PRAGUE 17 and others) which revealed comparable efficacy and safety with long-term warfarin for the prevention of stroke and systemic embolization.  The trials did show non inferiority of Watchman versus warfarin.  In addition, Left atrial appendage closure was associated with a low risk of hemorrhagic stroke, cardiovascular death and all cause death.      We discussed the Watchman procedure and risks at length including anesthesia, venous access, transesophageal echocardiogram, possible ICE, transseptal procedure and placement of the Watchman itself.  Major risks include but not limited to stroke, MI, death, cardiac  perforation, emergency open-heart surgery, embolization of device, renal damage and vascular damage.   We discussed the anticoagulation strategy status post Watchman which typically includes 45 days of oral anticoagulation followed by a DAVID and if all is good aspirin and Plavix for 6 months followed by aspirin alone.    All questions answered.      Interval History: 84-year-old retired  who has known carotid plaque with maximal stenosis of 50 to 69% in the right carotid artery and less than 50% in the left carotid artery. He carries a chart diagnosis of CAD but denies any clinical history.     He comes in for routine cardiology fu, previously seen by Dr. Nevarez. He gets a rare chest twinge. Also his arm fell asleep while sleeping in [place]. Now improved.  He  is physically active and has not had exertional related discomfort.  He walks and golfs with no symptoms.  He walked 10 miles last week. He rides a bike with no symptoms. HE does get an ocassional L sided gassy chest pain at rest, lasts seconds and resolves.     He does not smoke cigarettes.  He is on Crestor 40 mg daily and has an LDL cholesterol 56 12/2021.    His dad had afib and a cva.     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  A-fib is a new diagnosis.  Chads Vasc score is 4.     Echo 12/10/2024: EF 52% mild mr, ar. Mildly dilated aor root 4.1 cm.     Nuc Rx stress 12/10/2024: no evidence of stress induced myocardial ischemia. Normal lvf.         Patient Active Problem List   Diagnosis    Coronary artery disease involving native coronary artery of native heart without angina pectoris    Stenosis of right carotid artery    Gross hematuria    COPD, group B, by GOLD 2017 classification ([facility])    GERD (gastroesophageal reflux disease)    Elevated prostate specific antigen (PSA)    Mixed hyperlipidemia    Primary hypertension     Past Medical History:   Diagnosis Date    A-fib ([facility]) 11/26/2024    Hypertension 2010     Social  History     Socioeconomic History    Marital status: /Civil Union     Spouse name: Not on file    Number of children: Not on file    Years of education: Not on file    Highest education level: Not on file   Occupational History    Not on file   Tobacco Use    Smoking status: Never     Passive exposure: Never    Smokeless tobacco: Never   Vaping Use    Vaping status: Never Used   Substance and Sexual Activity    Alcohol use: Not Currently    Drug use: Never    Sexual activity: Yes     Partners: Female   Other Topics Concern    Not on file   Social History Narrative    Not on file     Social Drivers of Health     Financial Resource Strain: Low Risk  (11/29/2023)    Received from [employer]    Financial Resource Strain     Do you have any trouble paying for your medications, or do you think you might in the future?: No     Does your family have trouble paying for medicine? (Household - for ages 0-17 years): Not on file   Food Insecurity: No Food Insecurity (5/23/2024)    Nursing - Inadequate Food Risk Classification     Worried About Running Out of Food in the Last Year: Never true     Ran Out of Food in the Last Year: Never true     Ran Out of Food in the Last Year: Not on file   Transportation Needs: No Transportation Needs (5/23/2024)    PRAPARE - Transportation     Lack of Transportation (Medical): No     Lack of Transportation (Non-Medical): No   Physical Activity: Not on file   Stress: Not on file   Social Connections: Socially Integrated (11/29/2023)    Received from [employer]    Social Connections     How often do you feel lonely or isolated from those around you?: Never   Intimate Partner Violence: Not on file   Housing Stability: Unknown (5/23/2024)    Housing Stability Vital Sign     Unable to Pay for Housing in the Last Year: No     Number of Times Moved in the Last Year: Not on file     Homeless in the Last Year: No      Family History   Problem Relation Age of Onset    Heart disease Father     Heart  failure Father     Hypertension Father     Stroke Father      Past Surgical History:   Procedure Laterality Date    APPENDECTOMY  1965       Current Outpatient Medications:     Advair -21 MCG/ACT inhaler, , Disp: , Rfl:     albuterol (ProAir HFA) 90 mcg/act inhaler, Inhale 1 puff, Disp: , Rfl:     albuterol (PROVENTIL HFA,VENTOLIN HFA) 90 mcg/act inhaler, , Disp: , Rfl:     apixaban (Eliquis) 5 mg, Take 1 tablet (5 mg total) by mouth 2 (two) times a day, Disp: 180 tablet, Rfl: 2    celecoxib (CeleBREX) 200 mg capsule, Take 200 mg by mouth daily, Disp: , Rfl:     cycloSPORINE (RESTASIS) 0.05 % ophthalmic emulsion, instill 1 drop into both eyes twice a day, Disp: , Rfl:     famotidine (PEPCID) 20 mg tablet, Take 1 tablet by mouth every morning, Disp: , Rfl:     finasteride (PROSCAR) 5 mg tablet, TAKE 1 TABLET (5 MG TOTAL) BY MOUTH DAILY., Disp: 90 tablet, Rfl: 1    gabapentin (NEURONTIN) 100 mg capsule, , Disp: , Rfl:     montelukast (SINGULAIR) 10 mg tablet, Take 10 mg by mouth daily  , Disp: , Rfl:     pantoprazole (PROTONIX) 20 mg tablet, Take 1 tablet (20 mg total) by mouth daily in the early morning, Disp: 30 tablet, Rfl: 0    ramipril (ALTACE) 10 MG capsule, Take 10 mg by mouth daily  , Disp: , Rfl:     rosuvastatin (CRESTOR) 40 MG tablet, Take 20 mg by mouth daily, Disp: , Rfl:   Allergies   Allergen Reactions    Bee Pollen Swelling       Labs:  No visits with results within 2 Month(s) from this visit.   Latest known visit with results is:   Telephone on 01/08/2025   Component Date Value    Urine Culture Result 01/13/2025 Final report     Result 1 01/13/2025 No growth      Imaging: No results found.    Review of Systems:  Review of Systems negative    Physical Exam:  150/76.  Heart rate 72 and regular.  No carotid bruits.  Irregular rhythm.  No murmurs or gallops.  Lungs clear.  No edema.      Cali Ashford MD

## 2025-05-12 NOTE — ASSESSMENT & PLAN NOTE
- S/p negative cystoscopy on 6/5/24   - Urine cytology (6/5/24) - negative  - CTU (8/26/24) - Previously questioned right renal interpolar 1.2 cm lesion demonstrates no significant enhancement, likely representing mildly complex proteinaceous cyst, a Bosniak 2 lesion.. No suspicious focal renal or urothelial mass lesion. Mild bladder wall thickening with surrounding fat stranding predominantly involving the dome of the bladder. Enlarged prostate   Urine negative for infection. Send for micro and cytology  Repeat CTU and return to review  Consider repeat cystoscopy   Orders:    POCT urine dip    POCT Measure PVR    Basic metabolic panel; Future    PSA Total, Diagnostic; Future    CT renal protocol; Future

## 2025-05-12 NOTE — PROGRESS NOTES
Name: Anthony Nelson      : 1940      MRN: [id number]  Encounter Provider: Renetta Tan PA-C  Encounter Date: 2025   Encounter department: [facility]OLOG[facility] [place]  :  Assessment & Plan  Gross hematuria  - S/p negative cystoscopy on 24   - Urine cytology (24) - negative  - CTU (24) - Previously questioned right renal interpolar 1.2 cm lesion demonstrates no significant enhancement, likely representing mildly complex proteinaceous cyst, a Bosniak 2 lesion.. No suspicious focal renal or urothelial mass lesion. Mild bladder wall thickening with surrounding fat stranding predominantly involving the dome of the bladder. Enlarged prostate   Urine negative for infection. Send for micro and cytology  Repeat CTU and return to review  Consider repeat cystoscopy   Orders:    POCT urine dip    POCT Measure PVR    Basic metabolic panel; Future    PSA Total, Diagnostic; Future    CT renal protocol; Future    Benign prostatic hyperplasia with lower urinary tract symptoms, symptom details unspecified  Continue finasteride  PVR today 171 mL  - Recent imaging negative for hydronephrosis.   - Reviewed addition of alpha blockage for improved voiding which he declines. Recommend timed voiding and double voiding.        Elevated PSA  - History of negative prostate biopsies in the past   - PSA 11.3 on 24. PSAD of 0.07  PSA ordered  mpMRI ordered for prostate biopsy planning. Fusion biopsy for PIRADS 4 and 5  Orders:    MRI prostate multiparametric wo w contrast; Future    Basic metabolic panel; Future    PSA Total, Diagnostic; Future        History of Present Illness   Anthony Nelson is a 84 y.o. male who presents for follow up.    Admitted late May 2024 with . Uro saw as c/s. ED placed 3-way Roman. He passed clamp trial. Started on finasteride while inpt     Prev saw urologist in [place]. Had prev biopsies negative for cancer. Reported PSA around 6 in Dec 2023.      LUTS: nocturia x2, somewhat  weak stream.     PSA 10.84 on 5/22/24 (with Roman in place)     Passed TOV on 5/29/24      Work-up with cystoscopy, CT urogram and urine cytology all negative    Continues with intermittent hematuria. Every 2-3 weeks. States this may be related to golfing. Color varies from cherry red to deep red. Denies clots. Is on Eliquis for Afib.     Review of Systems   Constitutional:  Negative for activity change, appetite change, chills and fever.   HENT:  Negative for congestion and trouble swallowing.    Respiratory:  Negative for cough and shortness of breath.    Cardiovascular:  Negative for chest pain, palpitations and leg swelling.   Gastrointestinal:  Negative for abdominal pain, constipation, diarrhea, nausea and vomiting.   Genitourinary:  Positive for hematuria. Negative for difficulty urinating, dysuria, flank pain, frequency and urgency.   Musculoskeletal:  Negative for back pain and gait problem.   Skin:  Negative for wound.   Allergic/Immunologic: Negative for immunocompromised state.   Neurological:  Negative for dizziness and syncope.   Hematological:  Does not bruise/bleed easily.   Psychiatric/Behavioral:  Negative for confusion.    All other systems reviewed and are negative.         Objective   /80 (BP Location: Left arm, Patient Position: Sitting, Cuff Size: Large)   Pulse 79   Temp 97.6 °F (36.4 °C)   Ht 6' (1.829 m)   Wt 98.9 kg (218 lb)   SpO2 97%   BMI 29.57 kg/m²     Physical Exam  Constitutional:       Appearance: Normal appearance.   HENT:      Head: Normocephalic.      Nose: Nose normal.      Mouth/Throat:      Pharynx: Oropharynx is clear.   Eyes:      Extraocular Movements: Extraocular movements intact.      Pupils: Pupils are equal, round, and reactive to light.   Pulmonary:      Effort: Pulmonary effort is normal.   Genitourinary:     Comments: Prostate smooth, symmetrical, no palpable nodules    Musculoskeletal:         General: Normal range of motion.      Cervical back: Normal  range of motion.   Skin:     General: Skin is warm.   Neurological:      General: No focal deficit present.      Mental Status: He is alert and oriented to person, place, and time. Mental status is at baseline.   Psychiatric:         Mood and Affect: Mood normal.         Behavior: Behavior normal.         Thought Content: Thought content normal.         Judgment: Judgment normal.           Results   Lab Results   Component Value Date    PSA 10.84 (H) 05/22/2024     Lab Results   Component Value Date    CALCIUM 8.3 (L) 05/24/2024    K 4.4 05/24/2024    CO2 27 05/24/2024     (H) 05/24/2024    BUN 23 05/24/2024    CREATININE 1.24 05/24/2024     Lab Results   Component Value Date    WBC 7.13 05/24/2024    HGB 11.2 (L) 05/24/2024    HCT 34.2 (L) 05/24/2024    MCV 97 05/24/2024     05/24/2024       Office Urine Dip  Recent Results (from the past hour)   POCT Measure PVR    Collection Time: 05/13/25  7:15 AM   Result Value Ref Range    POST-VOID RESIDUAL VOLUME, ML  mL   POCT urine dip    Collection Time: 05/13/25  7:17 AM   Result Value Ref Range    LEUKOCYTE ESTERASE,UA -     NITRITE,UA -     SL AMB POCT UROBILINOGEN 0.2     POCT URINE PROTEIN +      PH,UA 5.0     BLOOD,UA +++     SPECIFIC GRAVITY,UA 1.010     KETONES,UA -     BILIRUBIN,UA -     GLUCOSE, UA -      COLOR,UA dark     CLARITY,UA cloudy

## 2025-05-13 ENCOUNTER — OFFICE VISIT (OUTPATIENT)
Dept: UROLOGY | Facility: CLINIC | Age: 85
End: 2025-05-13
Payer: COMMERCIAL

## 2025-05-13 VITALS
WEIGHT: 218 LBS | SYSTOLIC BLOOD PRESSURE: 142 MMHG | HEIGHT: 72 IN | HEART RATE: 79 BPM | TEMPERATURE: 97.6 F | OXYGEN SATURATION: 97 % | DIASTOLIC BLOOD PRESSURE: 80 MMHG | BODY MASS INDEX: 29.53 KG/M2

## 2025-05-13 DIAGNOSIS — N40.1 BENIGN PROSTATIC HYPERPLASIA WITH LOWER URINARY TRACT SYMPTOMS, SYMPTOM DETAILS UNSPECIFIED: ICD-10-CM

## 2025-05-13 DIAGNOSIS — R31.0 GROSS HEMATURIA: Primary | ICD-10-CM

## 2025-05-13 DIAGNOSIS — R97.20 ELEVATED PSA: ICD-10-CM

## 2025-05-13 LAB
BACTERIA UR QL AUTO: ABNORMAL /HPF
BILIRUB UR QL STRIP: NEGATIVE
CLARITY UR: ABNORMAL
COLOR UR: YELLOW
GLUCOSE UR STRIP-MCNC: NEGATIVE MG/DL
HGB UR QL STRIP.AUTO: ABNORMAL
KETONES UR STRIP-MCNC: NEGATIVE MG/DL
LEUKOCYTE ESTERASE UR QL STRIP: ABNORMAL
MUCOUS THREADS UR QL AUTO: ABNORMAL
NITRITE UR QL STRIP: NEGATIVE
NON-SQ EPI CELLS URNS QL MICRO: ABNORMAL /HPF
PH UR STRIP.AUTO: 6 [PH]
POST-VOID RESIDUAL VOLUME, ML POC: 171 ML
PROT UR STRIP-MCNC: ABNORMAL MG/DL
RBC #/AREA URNS AUTO: ABNORMAL /HPF
SL AMB  POCT GLUCOSE, UA: NORMAL
SL AMB LEUKOCYTE ESTERASE,UA: NORMAL
SL AMB POCT BILIRUBIN,UA: NORMAL
SL AMB POCT BLOOD,UA: NORMAL
SL AMB POCT CLARITY,UA: NORMAL
SL AMB POCT COLOR,UA: NORMAL
SL AMB POCT KETONES,UA: NORMAL
SL AMB POCT NITRITE,UA: NORMAL
SL AMB POCT PH,UA: 5
SL AMB POCT SPECIFIC GRAVITY,UA: 1.01
SL AMB POCT URINE PROTEIN: NORMAL
SL AMB POCT UROBILINOGEN: 0.2
SP GR UR STRIP.AUTO: 1.02 (ref 1–1.03)
UROBILINOGEN UR STRIP-ACNC: <2 MG/DL
WBC #/AREA URNS AUTO: ABNORMAL /HPF

## 2025-05-13 PROCEDURE — 81002 URINALYSIS NONAUTO W/O SCOPE: CPT | Performed by: PHYSICIAN ASSISTANT

## 2025-05-13 PROCEDURE — 88112 CYTOPATH CELL ENHANCE TECH: CPT | Performed by: PATHOLOGY

## 2025-05-13 PROCEDURE — 99213 OFFICE O/P EST LOW 20 MIN: CPT | Performed by: PHYSICIAN ASSISTANT

## 2025-05-13 PROCEDURE — 81001 URINALYSIS AUTO W/SCOPE: CPT | Performed by: PHYSICIAN ASSISTANT

## 2025-05-13 PROCEDURE — 51798 US URINE CAPACITY MEASURE: CPT | Performed by: PHYSICIAN ASSISTANT

## 2025-05-15 PROCEDURE — 88112 CYTOPATH CELL ENHANCE TECH: CPT | Performed by: PATHOLOGY

## 2025-05-24 LAB
BUN SERPL-MCNC: 19 MG/DL (ref 8–27)
BUN/CREAT SERPL: 14 (ref 10–24)
CALCIUM SERPL-MCNC: 9.3 MG/DL (ref 8.6–10.2)
CHLORIDE SERPL-SCNC: 103 MMOL/L (ref 96–106)
CO2 SERPL-SCNC: 20 MMOL/L (ref 20–29)
CREAT SERPL-MCNC: 1.32 MG/DL (ref 0.76–1.27)
EGFR: 53 ML/MIN/1.73
GLUCOSE SERPL-MCNC: 101 MG/DL (ref 70–99)
POTASSIUM SERPL-SCNC: 4.8 MMOL/L (ref 3.5–5.2)
PSA SERPL-MCNC: 2.2 NG/ML (ref 0–4)
SODIUM SERPL-SCNC: 140 MMOL/L (ref 134–144)

## 2025-05-27 ENCOUNTER — RESULTS FOLLOW-UP (OUTPATIENT)
Dept: UROLOGY | Facility: CLINIC | Age: 85
End: 2025-05-27

## 2025-06-04 ENCOUNTER — HOSPITAL ENCOUNTER (OUTPATIENT)
Dept: CT IMAGING | Facility: HOSPITAL | Age: 85
Discharge: HOME | End: 2025-06-04
Attending: PHYSICIAN ASSISTANT
Payer: MEDICARE

## 2025-06-04 DIAGNOSIS — R31.0 GROSS HEMATURIA: ICD-10-CM

## 2025-06-04 DIAGNOSIS — I48.91 ATRIAL FIBRILLATION, UNSPECIFIED TYPE (HCC): ICD-10-CM

## 2025-06-04 PROCEDURE — 74178 CT ABD&PLV WO CNTR FLWD CNTR: CPT

## 2025-06-04 RX ADMIN — IOHEXOL 100 ML: 350 INJECTION, SOLUTION INTRAVENOUS at 15:02

## 2025-06-04 NOTE — TELEPHONE ENCOUNTER
Reason for call:   [x] Refill pharmacy change  [] Prior Auth  [] Other:     Office:   [] PCP/Provider -   [x] Specialty/Provider - Cali Ashford,     Medication: apixaban (Eliquis) 5 mg     Dose/Frequency: 1 tablet 2 times a day    Quantity: 180    Pharmacy: [facility]   Does the patient have enough for 3 days?   [] Yes   [] No - Send as HP to POD    Mail Away Pharmacy   Does the patient have enough for 10 days?   [x] Yes   [] No - Send as HP to POD

## 2025-06-06 ENCOUNTER — TELEPHONE (OUTPATIENT)
Dept: CARDIAC SURGERY | Facility: CLINIC | Age: 85
End: 2025-06-06

## 2025-06-06 NOTE — TELEPHONE ENCOUNTER
LVM requesting return call    What Type Of Note Output Would You Prefer (Optional)?: Bullet Format Has Your Skin Lesion Been Treated?: not been treated Is This A New Presentation, Or A Follow-Up?: Skin Lesion

## 2025-06-10 ENCOUNTER — HOSPITAL ENCOUNTER (OUTPATIENT)
Dept: RADIOLOGY | Age: 85
Discharge: HOME | End: 2025-06-10
Attending: PHYSICIAN ASSISTANT
Payer: MEDICARE

## 2025-06-10 DIAGNOSIS — R97.20 ELEVATED PSA: ICD-10-CM

## 2025-06-10 PROCEDURE — 72197 MRI PELVIS W/O & W/DYE: CPT

## 2025-06-10 PROCEDURE — A9585 GADOBUTROL INJECTION: HCPCS | Performed by: PHYSICIAN ASSISTANT

## 2025-06-10 PROCEDURE — 76377 3D RENDER W/INTRP POSTPROCES: CPT

## 2025-06-10 RX ORDER — GADOBUTROL 604.72 MG/ML
10 INJECTION INTRAVENOUS
Status: COMPLETED | OUTPATIENT
Start: 2025-06-10 | End: 2025-06-10

## 2025-06-10 RX ADMIN — GADOBUTROL 10 ML: 604.72 INJECTION INTRAVENOUS at 14:38

## 2025-06-16 ENCOUNTER — RESULTS FOLLOW-UP (OUTPATIENT)
Dept: UROLOGY | Facility: CLINIC | Age: 85
End: 2025-06-16

## 2025-06-20 ENCOUNTER — TELEPHONE (OUTPATIENT)
Dept: CARDIAC SURGERY | Facility: CLINIC | Age: 85
End: 2025-06-20

## 2025-06-26 NOTE — PROGRESS NOTES
Name: Anthony Nelson      : 1940      MRN: [id number]  Encounter Provider: Sarah Schnitzler, PA-C  Encounter Date: 2025   Encounter department: [facility] UROLOGY [place]  :  Assessment & Plan  Gross hematuria  - S/p negative cystoscopy on 24   - Urine cytology (24) - negative  - CTU (24) - Previously questioned right renal interpolar 1.2 cm lesion demonstrates no significant enhancement, likely representing mildly complex proteinaceous cyst, a Bosniak 2 lesion.. No suspicious focal renal or urothelial mass lesion. Mild bladder wall thickening with surrounding fat stranding predominantly involving the dome of the bladder. Enlarged prostate   - Urine cytology from 25 - negative  - Repeat CTU on 25 for recurrent gross hematuria - No suspicious renal mass. No urinary tract calculi.  Mild bladder wall thickening/trabeculation with perivesicular fat stranding at the bladder dome, unchanged from prior.  - Urine dip today +++ blood. Negative nitrites or leuks. He notes some intermittent minor dysuria. No recent hematuria episodes. I advised should he experience gross hematuria again, would recommend scheduling repeat cystoscopy        Benign prostatic hyperplasia with lower urinary tract symptoms, symptom details unspecified  - History of incomplete emptying with PVRs 104-185 mL   - PVR today 119 mL   - Continue finasteride.   - Recent CT negative for hydronephrosis.   - Consider Flomax which he declines. Recommend timed voiding and double voiding.        Elevated PSA  - History of negative prostate biopsies in the past   - PSA 11.3 on 24. PSAD of 0.07  - Repeat PSA from 25 was 2.2  - mpMRI from 6/10/25 - PI-RADSv2.1 Category 4 - High (clinically significant cancer is likely to be present). Suspected 1.1 cm lesion in the posterolateral left peripheral zone at apex. No extraprostatic tumor, seminal vesicle invasion, pelvic lymphadenopathy, or pelvic osseous metastatic  disease. Moderate BPH with calculated prostate volume of 64 mL.   - Discussed MRI fusion guided prostate biopsy. Reviewed procedure details and risks. He is agreeable to proceed. Consent signed. Case requested. Will need clearance to hold Eliquis           History of Present Illness   Anthony Nelson is a 84 y.o. male who presents for follow up visit.     Admitted late May 2024 with . Uro saw as c/s. ED placed 3-way Roman. He passed clamp trial. Started on finasteride while inpt. Work-up with cystoscopy, CT urogram and urine cytology all negative. He was seen in May of this year with ongoing intermittent gross hematuria. He underwent repeat urine cytology and CT urogram which were negative. He reports he has not had any recent gross hematuria. Having some intermittent dysuria.      Prev saw urologist in [place] for elevated PSA.. Had prev biopsies negative for cancer. Reported PSA around 6 in Dec 2023. PSA increased 11.3 last year. Underwent prostate MRI earlier this month which shows a PIRADS 4 lesion. Most recent PSA actually decreased to 2.2.       Review of Systems   Constitutional:  Negative for chills and fever.   Respiratory:  Negative for shortness of breath.    Cardiovascular:  Negative for chest pain.   Gastrointestinal:  Negative for abdominal pain.   Genitourinary:  Negative for difficulty urinating, dysuria, flank pain, frequency, hematuria and urgency.   Neurological:  Negative for dizziness.        Objective   There were no vitals taken for this visit.    Physical Exam  Constitutional:       Appearance: Normal appearance.   HENT:      Head: Normocephalic and atraumatic.      Right Ear: External ear normal.      Left Ear: External ear normal.      Nose: Nose normal.     Eyes:      General: No scleral icterus.     Conjunctiva/sclera: Conjunctivae normal.       Cardiovascular:      Rate and Rhythm: Normal rate and regular rhythm.      Pulses: Normal pulses.      Heart sounds: Normal heart sounds.   Pulmonary:       Effort: Pulmonary effort is normal.      Breath sounds: Normal breath sounds.   Abdominal:      General: Abdomen is flat.      Palpations: Abdomen is soft.      Tenderness: There is no abdominal tenderness.     Musculoskeletal:         General: Normal range of motion.      Cervical back: Normal range of motion.     Skin:     General: Skin is warm and dry.     Neurological:      General: No focal deficit present.      Mental Status: He is alert and oriented to person, place, and time.     Psychiatric:         Mood and Affect: Mood normal.         Behavior: Behavior normal.         Thought Content: Thought content normal.         Judgment: Judgment normal.          Results   Lab Results   Component Value Date    PSA 2.2 05/23/2025    PSA 10.84 (H) 05/22/2024     Lab Results   Component Value Date    CALCIUM 8.3 (L) 05/24/2024    K 4.8 05/23/2025    CO2 20 05/23/2025     05/23/2025    BUN 19 05/23/2025    CREATININE 1.32 (H) 05/23/2025     Lab Results   Component Value Date    WBC 7.13 05/24/2024    HGB 11.2 (L) 05/24/2024    HCT 34.2 (L) 05/24/2024    MCV 97 05/24/2024     05/24/2024       Office Urine Dip  No results found for this or any previous visit (from the past hour).

## 2025-06-26 NOTE — ASSESSMENT & PLAN NOTE
- S/p negative cystoscopy on 6/5/24   - Urine cytology (6/5/24) - negative  - CTU (8/26/24) - Previously questioned right renal interpolar 1.2 cm lesion demonstrates no significant enhancement, likely representing mildly complex proteinaceous cyst, a Bosniak 2 lesion.. No suspicious focal renal or urothelial mass lesion. Mild bladder wall thickening with surrounding fat stranding predominantly involving the dome of the bladder. Enlarged prostate   - Urine cytology from 5/13/25 - negative  - Repeat CTU on 6/4/25 for recurrent gross hematuria - No suspicious renal mass. No urinary tract calculi.  Mild bladder wall thickening/trabeculation with perivesicular fat stranding at the bladder dome, unchanged from prior.  - Urine dip today +++ blood. Negative nitrites or leuks. He notes some intermittent minor dysuria. No recent hematuria episodes. I advised should he experience gross hematuria again, would recommend scheduling repeat cystoscopy

## 2025-06-27 ENCOUNTER — OFFICE VISIT (OUTPATIENT)
Dept: UROLOGY | Facility: CLINIC | Age: 85
End: 2025-06-27
Payer: MEDICARE

## 2025-06-27 ENCOUNTER — TELEPHONE (OUTPATIENT)
Dept: UROLOGY | Facility: CLINIC | Age: 85
End: 2025-06-27

## 2025-06-27 ENCOUNTER — APPOINTMENT (OUTPATIENT)
Dept: LAB | Facility: HOSPITAL | Age: 85
End: 2025-06-27
Attending: PHYSICIAN ASSISTANT
Payer: MEDICARE

## 2025-06-27 VITALS
BODY MASS INDEX: 29.39 KG/M2 | SYSTOLIC BLOOD PRESSURE: 148 MMHG | OXYGEN SATURATION: 99 % | TEMPERATURE: 97.6 F | DIASTOLIC BLOOD PRESSURE: 86 MMHG | HEIGHT: 72 IN | WEIGHT: 217 LBS | HEART RATE: 97 BPM

## 2025-06-27 DIAGNOSIS — R31.0 GROSS HEMATURIA: Primary | ICD-10-CM

## 2025-06-27 DIAGNOSIS — R97.20 ELEVATED PSA: ICD-10-CM

## 2025-06-27 DIAGNOSIS — R31.9 HEMATURIA, UNSPECIFIED TYPE: ICD-10-CM

## 2025-06-27 DIAGNOSIS — N40.1 BENIGN PROSTATIC HYPERPLASIA WITH LOWER URINARY TRACT SYMPTOMS, SYMPTOM DETAILS UNSPECIFIED: ICD-10-CM

## 2025-06-27 LAB
BACTERIA UR QL AUTO: ABNORMAL /HPF
BILIRUB UR QL STRIP: NEGATIVE
CLARITY UR: ABNORMAL
COLOR UR: YELLOW
GLUCOSE UR STRIP-MCNC: NEGATIVE MG/DL
HGB UR QL STRIP.AUTO: ABNORMAL
KETONES UR STRIP-MCNC: NEGATIVE MG/DL
LEUKOCYTE ESTERASE UR QL STRIP: ABNORMAL
NITRITE UR QL STRIP: NEGATIVE
NON-SQ EPI CELLS URNS QL MICRO: ABNORMAL /HPF
PH UR STRIP.AUTO: 6 [PH]
POST-VOID RESIDUAL VOLUME, ML POC: 119 ML
PROT UR STRIP-MCNC: ABNORMAL MG/DL
RBC #/AREA URNS AUTO: ABNORMAL /HPF
SL AMB  POCT GLUCOSE, UA: NORMAL
SL AMB LEUKOCYTE ESTERASE,UA: NORMAL
SL AMB POCT BILIRUBIN,UA: NORMAL
SL AMB POCT BLOOD,UA: NORMAL
SL AMB POCT CLARITY,UA: NORMAL
SL AMB POCT COLOR,UA: NORMAL
SL AMB POCT KETONES,UA: NORMAL
SL AMB POCT NITRITE,UA: NORMAL
SL AMB POCT PH,UA: 5
SL AMB POCT SPECIFIC GRAVITY,UA: 1.01
SL AMB POCT URINE PROTEIN: NORMAL
SL AMB POCT UROBILINOGEN: 0.2
SP GR UR STRIP.AUTO: 1.01 (ref 1–1.03)
UROBILINOGEN UR STRIP-ACNC: <2 MG/DL
WBC #/AREA URNS AUTO: ABNORMAL /HPF

## 2025-06-27 PROCEDURE — 87086 URINE CULTURE/COLONY COUNT: CPT | Performed by: PHYSICIAN ASSISTANT

## 2025-06-27 PROCEDURE — 81002 URINALYSIS NONAUTO W/O SCOPE: CPT | Performed by: PHYSICIAN ASSISTANT

## 2025-06-27 PROCEDURE — 81001 URINALYSIS AUTO W/SCOPE: CPT

## 2025-06-27 PROCEDURE — 51798 US URINE CAPACITY MEASURE: CPT | Performed by: PHYSICIAN ASSISTANT

## 2025-06-27 PROCEDURE — 99214 OFFICE O/P EST MOD 30 MIN: CPT | Performed by: PHYSICIAN ASSISTANT

## 2025-06-27 NOTE — Clinical Note
PIRADS 4 lesion, please schedule MRI fusion guided prostate biopsy. Will need clearance to hold Eliquis

## 2025-06-28 LAB — BACTERIA UR CULT: NORMAL

## 2025-06-30 ENCOUNTER — RESULTS FOLLOW-UP (OUTPATIENT)
Dept: OTHER | Facility: HOSPITAL | Age: 85
End: 2025-06-30

## 2025-06-30 NOTE — TELEPHONE ENCOUNTER
Spoke with pt relaying Magy CORTES message,  Negative for UTI   Pt verbalized understanding   ----- Message from Sarah Schnitzler, PA-C sent at 6/30/2025 10:01 AM EDT -----  Negative for UTI  ----- Message -----  From: Gayle Nelson MA  Sent: 6/27/2025   8:17 AM EDT  To: Sarah Schnitzler, PA-C

## 2025-07-02 ENCOUNTER — TELEPHONE (OUTPATIENT)
Dept: CARDIAC SURGERY | Facility: CLINIC | Age: 85
End: 2025-07-02

## 2025-07-02 NOTE — TELEPHONE ENCOUNTER
Spoke with patient, he has other health issues to address at this time, will consider November for [facility]. Informed him I will contact him in October to discuss.

## 2025-07-07 ENCOUNTER — NURSE TRIAGE (OUTPATIENT)
Age: 85
End: 2025-07-07

## 2025-07-07 ENCOUNTER — PREP FOR PROCEDURE (OUTPATIENT)
Dept: UROLOGY | Facility: MEDICAL CENTER | Age: 85
End: 2025-07-07

## 2025-07-07 DIAGNOSIS — R39.89 SUSPECTED UTI: ICD-10-CM

## 2025-07-07 DIAGNOSIS — R39.9 UTI SYMPTOMS: Primary | ICD-10-CM

## 2025-07-07 DIAGNOSIS — R97.20 ELEVATED PSA: Primary | ICD-10-CM

## 2025-07-07 DIAGNOSIS — Z01.812 PRE-OPERATIVE LABORATORY EXAMINATION: ICD-10-CM

## 2025-07-07 DIAGNOSIS — Z01.810 PRE-OPERATIVE CARDIOVASCULAR EXAMINATION: ICD-10-CM

## 2025-07-07 NOTE — TELEPHONE ENCOUNTER
Pt under care of: Schnitzler  Office Location: [place]    Insurance   Current Insurance? Medicare   Insurance E-verified? Yes    History  Last Seen (include Follow Up recommendations of last visit- see Office Visit - Instructions): 6/27/2025 - Return for scheduling MRI fusion guided prostate biopsy.     Pt calling to advise when he was seen in the office on 6/27/25, he was told to reach out to Magy directly with any changes in his symptoms. Pt is requesting a direct call back from the provider as he is still experiencing urinary symptoms despite negative urine testing.     Pt can be reached at: [phone]

## 2025-07-08 ENCOUNTER — APPOINTMENT (OUTPATIENT)
Dept: LAB | Facility: CLINIC | Age: 85
End: 2025-07-08
Payer: MEDICARE

## 2025-07-08 ENCOUNTER — TELEPHONE (OUTPATIENT)
Age: 85
End: 2025-07-08

## 2025-07-08 DIAGNOSIS — R31.0 GROSS HEMATURIA: ICD-10-CM

## 2025-07-08 DIAGNOSIS — N40.1 BENIGN PROSTATIC HYPERPLASIA WITH LOWER URINARY TRACT SYMPTOMS, SYMPTOM DETAILS UNSPECIFIED: Primary | ICD-10-CM

## 2025-07-08 DIAGNOSIS — R39.89 SUSPECTED UTI: ICD-10-CM

## 2025-07-08 DIAGNOSIS — R97.20 ELEVATED PSA: ICD-10-CM

## 2025-07-08 DIAGNOSIS — Z01.812 PRE-OPERATIVE LABORATORY EXAMINATION: ICD-10-CM

## 2025-07-08 DIAGNOSIS — R39.9 UTI SYMPTOMS: ICD-10-CM

## 2025-07-08 LAB

## 2025-07-08 PROCEDURE — 87086 URINE CULTURE/COLONY COUNT: CPT

## 2025-07-08 RX ORDER — TAMSULOSIN HYDROCHLORIDE 0.4 MG/1
0.4 CAPSULE ORAL
Qty: 90 CAPSULE | Refills: 1 | Status: SHIPPED | OUTPATIENT
Start: 2025-07-08

## 2025-07-08 NOTE — TELEPHONE ENCOUNTER
Last seen with Magy WELDONDimitris 06/30/25    SYMPTOMS: hematuria and slight burning began 3 days ago    OTHER HEALTH INFORMATION: merlot colored to pink lemonade colored;  occasional small clots    PROTOCOL DISPOSITION: See Within 2 Weeks in Office    CARE ADVICE PROVIDED:   Hydration / ER precautions    PRACTICE FOLLOW-UP:   Urine labs ordered;  going now;  Agreeable to cystoscopy since hematuria as returned;     Will  flomax today and start;  he wants to confirm that he should take both finasteride and flomax;      8/11/25 transperineal fusion biopsy.      When did you want cystoscopy scheduled?        1. When did you start with blood in your urine, and can you describe things in detail? Do you have any blood clots, is the hematuria continuous or intermittent and can you describe the color of the blood in your urine in relation to a beverage?   Sunday first noticed merlot colored after golfing;  yesterday pink lemonade colored at times;   this morning merlot colored with small clots   2. Do you have lower back, flank, or lower abdominal/bladder pain?   Chronic back pain  3. Are you experiencing urinary frequency, urgency, pain or burning or any other changes in your urination like being unable to urinate?   Occasional burning with urination; maybe frequency;   4. Do you take any blood thinners?   eliquis    6. Have you had any recent urine testing or imaging? (UA with micro, urine culture, kidney ultrasound, CT scan)? Or any recent urologic surgery or procedures?   Urine testing negative;   end of june 9. Do you have a history of bladder cancer?   no

## 2025-07-08 NOTE — TELEPHONE ENCOUNTER
Can take Flomax and finasteride together. Please schedule next available cystoscopy. ED precautions.

## 2025-07-08 NOTE — TELEPHONE ENCOUNTER
Reason for Disposition  • Patient presents with gross hematuria with small clots and able to urinate, new onset, with or without history of bladder cancer    Answer Assessment - Initial Assessment Questions  1. When did you start with blood in your urine, and can you describe things in detail? Do you have any blood clots, is the hematuria continuous or intermittent and can you describe the color of the blood in your urine in relation to a beverage?   Sunday first noticed merlot colored after golfing;  yesterday pink lemonade colored at times;   this morning merlot colored with small clots   2. Do you have lower back, flank, or lower abdominal/bladder pain?   Chronic back pain  3. Are you experiencing urinary frequency, urgency, pain or burning or any other changes in your urination like being unable to urinate?   Occasional burning with urination; maybe frequency;   4. Do you take any blood thinners?   eliquis    6. Have you had any recent urine testing or imaging? (UA with micro, urine culture, kidney ultrasound, CT scan)? Or any recent urologic surgery or procedures?   Urine testing negative;   end of june 9. Do you have a history of bladder cancer?   no    Protocols used: Urology-Gross Hemaruria-ADULT-[place]

## 2025-07-08 NOTE — TELEPHONE ENCOUNTER
I called patient and left message to further triage his symptoms.     When patient calls back please ask him what symptoms he is experiencing. Please advise him that flomax was sent to his pharmacy as well.

## 2025-07-09 ENCOUNTER — RESULTS FOLLOW-UP (OUTPATIENT)
Dept: UROLOGY | Facility: CLINIC | Age: 85
End: 2025-07-09

## 2025-07-09 LAB — BACTERIA UR CULT: NORMAL

## 2025-07-09 NOTE — TELEPHONE ENCOUNTER
Previous encounter :Sarah Schnitzler, PA-C to [facility]       7/8/25 12:44 PM  Note      Can take Flomax and finasteride together. Please schedule next available cystoscopy. ED precautions.       All of our offices are scheduling in Sept,Oct. November for cysto's    Facilitator RN Note: Received pt into spot #9 via wheelchair. Accompanied by daughter who is also HCP. Daughter states pt was dc'd from [facility] 3/4 s/p balloon dilation of trachea for glioblastoma. Pt has not started chemo treatment yet. Daughter states 2-3 days after d/c from [facility] pt started with c/o SOB, cough with yellow sputum, throat pain and audible stridor when breaths. Pt placed on tele monitor SR HR 90s-100. Denies any CP, dizziness, nausea. vomiting, palpitations. Skin warm dry and intact. Dr. Garcia and med student I to eval pt on arrival. #20 angio placed to LAC saline locked. Report given to primary RN Rhonda.

## 2025-07-09 NOTE — TELEPHONE ENCOUNTER
Called and spoke to the with results. PT stated still passing Blood and has been for 3 and a half days. Pt would like to know when will the PT have an appt for a Cysto. I let the PT know to give the Office some time to work on a Cysto appt due to the availability. PT verbalized understanding and would like Stephanie from the [facility] to call the PT back with information. PT stated she was nice and very informative. PT also wanted Dr. Bolaños and Magy CORTES to know still Blood in the PT's Urine.    ----- Message from Sarah Schnitzler, PA-C sent at 7/9/2025 11:27 AM EDT -----  Negative for UTI  ----- Message -----  From: Lab, Background User  Sent: 7/9/2025  11:17 AM EDT  To: Sarah Schnitzler, PA-C

## 2025-07-10 NOTE — TELEPHONE ENCOUNTER
Patient called and confirmed cysto appointment on 7/18 at the [place] location. Also said the bleeding is cleared up and he is doing better.

## 2025-07-10 NOTE — TELEPHONE ENCOUNTER
Called and l/m for pt that our [place] office  is unable to schedule him until January due to doctor availability in this location. Did hold cysto spot with Dr. Dubon for [facility] for 7/18/25. Asked pt call back to let us know if he could travel for this one appt.

## 2025-07-18 ENCOUNTER — PROCEDURE VISIT (OUTPATIENT)
Dept: UROLOGY | Facility: MEDICAL CENTER | Age: 85
End: 2025-07-18
Payer: MEDICARE

## 2025-07-18 VITALS
DIASTOLIC BLOOD PRESSURE: 72 MMHG | HEIGHT: 72 IN | WEIGHT: 215 LBS | HEART RATE: 73 BPM | BODY MASS INDEX: 29.12 KG/M2 | SYSTOLIC BLOOD PRESSURE: 138 MMHG | OXYGEN SATURATION: 96 %

## 2025-07-18 DIAGNOSIS — R97.20 ELEVATED PROSTATE SPECIFIC ANTIGEN (PSA): ICD-10-CM

## 2025-07-18 DIAGNOSIS — R31.0 GROSS HEMATURIA: Primary | ICD-10-CM

## 2025-07-18 LAB
SL AMB  POCT GLUCOSE, UA: ABNORMAL
SL AMB LEUKOCYTE ESTERASE,UA: ABNORMAL
SL AMB POCT BILIRUBIN,UA: ABNORMAL
SL AMB POCT BLOOD,UA: ABNORMAL
SL AMB POCT CLARITY,UA: CLEAR
SL AMB POCT COLOR,UA: YELLOW
SL AMB POCT KETONES,UA: ABNORMAL
SL AMB POCT NITRITE,UA: ABNORMAL
SL AMB POCT PH,UA: 6
SL AMB POCT SPECIFIC GRAVITY,UA: 1.01
SL AMB POCT URINE PROTEIN: ABNORMAL
SL AMB POCT UROBILINOGEN: 0.2

## 2025-07-18 PROCEDURE — 99214 OFFICE O/P EST MOD 30 MIN: CPT | Performed by: UROLOGY

## 2025-07-18 PROCEDURE — 81003 URINALYSIS AUTO W/O SCOPE: CPT | Performed by: UROLOGY

## 2025-07-18 PROCEDURE — 52000 CYSTOURETHROSCOPY: CPT | Performed by: UROLOGY

## 2025-07-18 PROCEDURE — 88112 CYTOPATH CELL ENHANCE TECH: CPT | Performed by: PATHOLOGY

## 2025-07-18 NOTE — LETTER
2025     Michi Lynne MD  [address] [place] [id number]    Patient: Anthony Nelson   YOB: 1940   Date of Visit: 2025       Dear Dr. Michi Lynne MD:    Thank you for referring Anthony Nelson to me for evaluation. Below are my notes for this consultation.    If you have questions, please do not hesitate to call me. I look forward to following your patient along with you.         Sincerely,        Johny Dubon MD        CC: No Recipients    Johny Dubon MD  2025  2:16 PM  Sign when Signing Visit  Name: Anthony Nelson      : 1940      MRN: [id number]  Encounter Provider: Johny Dubon MD  Encounter Date: 2025   Encounter department: [facility]  :  Assessment & Plan  Gross hematuria  Upper tracts are largely unremarkable by renal protocol CT.  On cystoscopy there is a large diverticulum and areas of erythema within the diverticulum and within the bladder.  These appear more inflammatory than neoplastic.  We will recheck a urine cytology.  In all likelihood gross hematuria is related to prostatic inflammation and vascularity along with his use of Eliquis.  Orders:  •  POCT urine dip auto non-scope  •  Cytology, urine    Elevated prostate specific antigen (PSA)  PI-RADS 4 lesion noted on MRI.  He is scheduled for fusion biopsy in August.           History of Present Illness   Anthony Nelson is a 85 y.o. male who presents to complete his evaluation for gross hematuria.  He is presently voiding well and gross hematuria has resolved.  He remains on combined medical therapy with tamsulosin and finasteride.  He notes he is scheduled for prostate biopsy next month secondary to elevated PSA and a PI-RADS 4 lesion on multiparametric MRI.  AUA SYMPTOM SCORE      Flowsheet Row Most Recent Value   AUA SYMPTOM SCORE    How often have you had a sensation of not emptying your bladder completely after you finished urinating? 1 (P)      How often have you had to urinate again less than two hours after you finished urinating? 1 (P)     How often have you found you stopped and started again several times when you urinate? 1 (P)     How often have you found it difficult to postpone urination? 1 (P)     How often have you had a weak urinary stream? 1 (P)     How often have you had to push or strain to begin urination? 0 (P)     How many times did you most typically get up to urinate from the time you went to bed at night until the time you got up in the morning? 1 (P)     Quality of Life: If you were to spend the rest of your life with your urinary condition just the way it is now, how would you feel about that? 3 (P)     AUA SYMPTOM SCORE 6 (P)            Review of Systems   Constitutional:  Negative for chills, diaphoresis, fatigue and fever.   HENT: Negative.     Eyes: Negative.    Respiratory: Negative.     Cardiovascular: Negative.    Endocrine: Negative.    Genitourinary:         See HPI   Musculoskeletal: Negative.    Skin: Negative.    Allergic/Immunologic: Negative.    Neurological: Negative.    Hematological: Negative.    Psychiatric/Behavioral: Negative.            Objective   /72 (BP Location: Left arm, Patient Position: Sitting, Cuff Size: Standard)   Pulse 73   Ht 6' (1.829 m)   Wt 97.5 kg (215 lb)   SpO2 96%   BMI 29.16 kg/m²     Physical Exam  Constitutional:       General: He is not in acute distress.     Appearance: Normal appearance. He is well-developed and normal weight. He is not ill-appearing, toxic-appearing or diaphoretic.   HENT:      Head: Normocephalic and atraumatic.     Eyes:      General: No scleral icterus.     Conjunctiva/sclera: Conjunctivae normal.       Cardiovascular:      Rate and Rhythm: Normal rate.   Pulmonary:      Effort: Pulmonary effort is normal.   Abdominal:      General: Abdomen is flat. There is no distension.      Palpations: There is no mass.      Tenderness: There is no abdominal  tenderness. There is no right CVA tenderness, left CVA tenderness, guarding or rebound.      Hernia: No hernia is present.   Genitourinary:     Penis: Normal.       Testes: Normal.     Musculoskeletal:      Cervical back: Neck supple.     Skin:     General: Skin is warm and dry.     Neurological:      Mental Status: He is alert and oriented to person, place, and time.     Psychiatric:         Behavior: Behavior normal.         Thought Content: Thought content normal.         Judgment: Judgment normal.           Cystoscopy     Date/Time  7/18/2025 1:30 PM     Performed by  Johny Dubon MD   Authorized by  Johny Dubon MD       [place] Protocol:  procedure performed by consultantConsent: Verbal consent obtained. Written consent obtained  Risks and benefits: risks, benefits and alternatives were discussed  Consent given by: patient  Patient understanding: patient states understanding of the procedure being performed  Patient consent: the patient's understanding of the procedure matches consent given  Procedure consent: procedure consent matches procedure scheduled  Required items: required blood products, implants, devices, and special equipment available  Patient identity confirmed: verbally with patient      Procedure Details:  Procedure type: cystoscopy    Patient tolerance: Patient tolerated the procedure well with no immediate complications    Additional Procedure Details:      Patient presents for cystoscopy.  I have discussed the reasons for doing the exam, and the potential risks and complications.  Patient expressed understanding, and signed informed consent document.    The patient was carefully  positioned supine on the examining table.  Sterile preparation was performed on the urethra.  Xylocaine jelly was instilled and left  Indwelling for the procedure.  The 15 Belgian flexible cystoscope was passed with the following findings:      Urethra: Normal without stricture    Prostate:  lateral  lobes-bilobar obstruction, no significant median lobe.    Prostate estimated at 40 to 60 g.  Prostate is vascular and friable.          Bladder: Moderate trabeculation, there is a large diverticulum.  Examination of the diverticulum does reveal areas of bladder erythema.  No papillary lesion.  No papillary lesion in the bladder.     Residual urine: Moderate    Patient tolerated the procedure well and was escorted from the examining table.     Results   Lab Results   Component Value Date    PSA 2.2 05/23/2025    PSA 10.84 (H) 05/22/2024     Lab Results   Component Value Date    CALCIUM 8.3 (L) 05/24/2024    K 4.8 05/23/2025    CO2 20 05/23/2025     05/23/2025    BUN 19 05/23/2025    CREATININE 1.32 (H) 05/23/2025     Lab Results   Component Value Date    WBC 7.13 05/24/2024    HGB 11.2 (L) 05/24/2024    HCT 34.2 (L) 05/24/2024    MCV 97 05/24/2024     05/24/2024       Office Urine Dip  Recent Results (from the past hour)   POCT urine dip auto non-scope    Collection Time: 07/18/25  1:37 PM   Result Value Ref Range     COLOR,UA Yellow     CLARITY,UA Clear     SPECIFIC GRAVITY,UA 1.010      PH,UA 6     LEUKOCYTE ESTERASE,UA Trace     NITRITE,UA Neg     GLUCOSE, UA Neg     KETONES,UA Neg     BILIRUBIN,UA Neg     BLOOD,UA Moderate     POCT URINE PROTEIN Trace     SL AMB POCT UROBILINOGEN 0.2

## 2025-07-18 NOTE — LETTER
2025     Jarek Bolaños DO  [address]  [address]  [facility] [address]    Patient: Anthony Nelson   YOB: 1940   Date of Visit: 2025       Dear Dr. Jarek Bolaños DO:    I saw patient to complete his hematuria workup.  He has a large diverticulum with several erythematous areas but no papillary lesions.  There is also a small area of erythema in 1 spot in his bladder.  The erythema appears inflammatory.  In all likelihood the prostate is the source of his hematuria.  He is on Eliquis.  I will recheck a cytology.  He will be having fusion biopsies with you in early August.          CC: No Recipients    Johny Dubon MD  2025  2:16 PM  Sign when Signing Visit  Name: Anthony Nelson      : 1940      MRN: [id number]  Encounter Provider: Johny Dubon MD  Encounter Date: 2025   [facility]  :  Assessment & Plan  Gross hematuria  Upper tracts are largely unremarkable by renal protocol CT.  On cystoscopy there is a large diverticulum and areas of erythema within the diverticulum and within the bladder.  These appear more inflammatory than neoplastic.  We will recheck a urine cytology.  In all likelihood gross hematuria is related to prostatic inflammation and vascularity along with his use of Eliquis.  Orders:  •  POCT urine dip auto non-scope  •  Cytology, urine    Elevated prostate specific antigen (PSA)  PI-RADS 4 lesion noted on MRI.  He is scheduled for fusion biopsy in August.           History of Present Illness   Anthony Nelson is a 85 y.o. male who presents to complete his evaluation for gross hematuria.  He is presently voiding well and gross hematuria has resolved.  He remains on combined medical therapy with tamsulosin and finasteride.  He notes he is scheduled for prostate biopsy next month secondary to elevated PSA and a PI-RADS 4 lesion on multiparametric MRI.  AUA SYMPTOM SCORE      Flowsheet Row Most  Recent Value   AUA SYMPTOM SCORE    How often have you had a sensation of not emptying your bladder completely after you finished urinating? 1 (P)     How often have you had to urinate again less than two hours after you finished urinating? 1 (P)     How often have you found you stopped and started again several times when you urinate? 1 (P)     How often have you found it difficult to postpone urination? 1 (P)     How often have you had a weak urinary stream? 1 (P)     How often have you had to push or strain to begin urination? 0 (P)     How many times did you most typically get up to urinate from the time you went to bed at night until the time you got up in the morning? 1 (P)     Quality of Life: If you were to spend the rest of your life with your urinary condition just the way it is now, how would you feel about that? 3 (P)     AUA SYMPTOM SCORE 6 (P)            Review of Systems   Constitutional:  Negative for chills, diaphoresis, fatigue and fever.   HENT: Negative.     Eyes: Negative.    Respiratory: Negative.     Cardiovascular: Negative.    Endocrine: Negative.    Genitourinary:         See HPI   Musculoskeletal: Negative.    Skin: Negative.    Allergic/Immunologic: Negative.    Neurological: Negative.    Hematological: Negative.    Psychiatric/Behavioral: Negative.            Objective   /72 (BP Location: Left arm, Patient Position: Sitting, Cuff Size: Standard)   Pulse 73   Ht 6' (1.829 m)   Wt 97.5 kg (215 lb)   SpO2 96%   BMI 29.16 kg/m²     Physical Exam  Constitutional:       General: He is not in acute distress.     Appearance: Normal appearance. He is well-developed and normal weight. He is not ill-appearing, toxic-appearing or diaphoretic.   HENT:      Head: Normocephalic and atraumatic.     Eyes:      General: No scleral icterus.     Conjunctiva/sclera: Conjunctivae normal.       Cardiovascular:      Rate and Rhythm: Normal rate.   Pulmonary:      Effort: Pulmonary effort is normal.    Abdominal:      General: Abdomen is flat. There is no distension.      Palpations: There is no mass.      Tenderness: There is no abdominal tenderness. There is no right CVA tenderness, left CVA tenderness, guarding or rebound.      Hernia: No hernia is present.   Genitourinary:     Penis: Normal.       Testes: Normal.     Musculoskeletal:      Cervical back: Neck supple.     Skin:     General: Skin is warm and dry.     Neurological:      Mental Status: He is alert and oriented to person, place, and time.     Psychiatric:         Behavior: Behavior normal.         Thought Content: Thought content normal.         Judgment: Judgment normal.           Cystoscopy     Date/Time  7/18/2025 1:30 PM     Performed by  Johny Dubon MD   Authorized by  Johny Dubon MD       [place] Protocol:  procedure performed by consultantConsent: Verbal consent obtained. Written consent obtained  Risks and benefits: risks, benefits and alternatives were discussed  Consent given by: patient  Patient understanding: patient states understanding of the procedure being performed  Patient consent: the patient's understanding of the procedure matches consent given  Procedure consent: procedure consent matches procedure scheduled  Required items: required blood products, implants, devices, and special equipment available  Patient identity confirmed: verbally with patient      Procedure Details:  Procedure type: cystoscopy    Patient tolerance: Patient tolerated the procedure well with no immediate complications    Additional Procedure Details:      Patient presents for cystoscopy.  I have discussed the reasons for doing the exam, and the potential risks and complications.  Patient expressed understanding, and signed informed consent document.    The patient was carefully  positioned supine on the examining table.  Sterile preparation was performed on the urethra.  Xylocaine jelly was instilled and left  Indwelling for the procedure.   The 15 Ethiopian flexible cystoscope was passed with the following findings:      Urethra: Normal without stricture    Prostate:  lateral lobes-bilobar obstruction, no significant median lobe.    Prostate estimated at 40 to 60 g.  Prostate is vascular and friable.          Bladder: Moderate trabeculation, there is a large diverticulum.  Examination of the diverticulum does reveal areas of bladder erythema.  No papillary lesion.  No papillary lesion in the bladder.     Residual urine: Moderate    Patient tolerated the procedure well and was escorted from the examining table.     Results   Lab Results   Component Value Date    PSA 2.2 05/23/2025    PSA 10.84 (H) 05/22/2024     Lab Results   Component Value Date    CALCIUM 8.3 (L) 05/24/2024    K 4.8 05/23/2025    CO2 20 05/23/2025     05/23/2025    BUN 19 05/23/2025    CREATININE 1.32 (H) 05/23/2025     Lab Results   Component Value Date    WBC 7.13 05/24/2024    HGB 11.2 (L) 05/24/2024    HCT 34.2 (L) 05/24/2024    MCV 97 05/24/2024     05/24/2024       Office Urine Dip  Recent Results (from the past hour)   POCT urine dip auto non-scope    Collection Time: 07/18/25  1:37 PM   Result Value Ref Range     COLOR,UA Yellow     CLARITY,UA Clear     SPECIFIC GRAVITY,UA 1.010      PH,UA 6     LEUKOCYTE ESTERASE,UA Trace     NITRITE,UA Neg     GLUCOSE, UA Neg     KETONES,UA Neg     BILIRUBIN,UA Neg     BLOOD,UA Moderate     POCT URINE PROTEIN Trace     SL AMB POCT UROBILINOGEN 0.2

## 2025-07-18 NOTE — PATIENT INSTRUCTIONS
"  Patient Education     Cystoscopy - Discharge instructions   The Basics   Written by the doctors and editors at [facility]   What are discharge instructions? -- Discharge instructions are information about how to take care of yourself after getting medical care for a health problem.  What is a cystoscopy? -- A cystoscopy is a procedure that lets a doctor see inside the bladder and urethra. The urethra is the tube that transports urine out of the bladder (figure 1). During cystoscopy, a doctor puts a thin tube with a tiny camera on the end into the urethra and moves it up into the bladder (figure 2). The tube is called a \"cystoscope.\"  How do I care for myself at home? -- Ask the doctor or nurse what you should do when you go home. Make sure that you understand exactly what you need to do to care for yourself. Ask questions if there is anything you do not understand.  It's important to drink plenty of water:   This helps flush out your bladder and relieves discomfort.   Drink at least 3 to 4 to glasses of water the first day after your procedure.   Urinate when you feel the need.  Some side effects are common after cystoscopy. These should only last for a short time after your procedure. They include:   Some pain or discomfort when urinating   A small amount of blood in your urine  These side effects should stop after you urinate a few times.  What follow-up care do I need? -- Your doctor or nurse will discuss the results of your cystoscopy with you.   If you had a biopsy, your doctor will let you know when your results are ready. They will talk to you about what they mean.   If your cystoscopy shows that you have a medical problem, your doctor will talk to you about your treatment options.  When should I call the doctor? -- Call for advice if you:   Have pain or discomfort when urinating for more than a day or 2   Have \"urinary urgency\" for more than a day or 2 - This is when you feel like you need to urinate " suddenly or in a hurry.   See a lot of blood in your urine at once   Still see blood in your urine after 5 days   Have a fever   Have pain in your belly  All topics are updated as new evidence becomes available and our peer review process is complete.  This topic retrieved from [employer] on: Apr 11, 2024.  Topic 468159 Version 1.0  Release: 32.3.2 - C32.100  © 2024 UpToDate, Inc. and/or its affiliates. All rights reserved.  figure 1: Anatomy of the urinary tract     Urine is made by the kidneys. It passes from the kidneys into the bladder through 2 tubes called the ureters. Then, it leaves the bladder through another tube called the urethra.  Graphic 93878 Version 8.0  figure 2: Cystoscopy     The doctor inserts a long, thin tube with a tiny camera on the end (cystoscope) into the urethra. Then, they thread the tube into the bladder to see inside.  Graphic 001541 Version 1.0  Consumer Information Use and Disclaimer   Disclaimer: This generalized information is a limited summary of diagnosis, treatment, and/or medication information. It is not meant to be comprehensive and should be used as a tool to help the user understand and/or assess potential diagnostic and treatment options. It does NOT include all information about conditions, treatments, medications, side effects, or risks that may apply to a specific patient. It is not intended to be medical advice or a substitute for the medical advice, diagnosis, or treatment of a health care provider based on the health care provider's examination and assessment of a patient's specific and unique circumstances. Patients must speak with a health care provider for complete information about their health, medical questions, and treatment options, including any risks or benefits regarding use of medications. This information does not endorse any treatments or medications as safe, effective, or approved for treating a specific patient. Up[employer]Date, Inc. and its affiliates  disclaim any warranty or liability relating to this information or the use thereof.The use of this information is governed by the Terms of Use, available at https://www.wolterskluwer.com/en/know/clinical-effectiveness-terms. 2024© [employer] and its affiliates and/or licensors. All rights reserved.  Copyright   © 2024 [employer] and/or its affiliates. All rights reserved.

## 2025-07-18 NOTE — ASSESSMENT & PLAN NOTE
Upper tracts are largely unremarkable by renal protocol CT.  On cystoscopy there is a large diverticulum and areas of erythema within the diverticulum and within the bladder.  These appear more inflammatory than neoplastic.  We will recheck a urine cytology.  In all likelihood gross hematuria is related to prostatic inflammation and vascularity along with his use of Eliquis.  Orders:    POCT urine dip auto non-scope    Cytology, urine

## 2025-07-18 NOTE — PROGRESS NOTES
Name: Anthony Nelson      : 1940      MRN: [id number]  Encounter Provider: Johny Dubon MD  Encounter Date: 2025   Encounter department: [facility] [place]  :  Assessment & Plan  Gross hematuria  Upper tracts are largely unremarkable by renal protocol CT.  On cystoscopy there is a large diverticulum and areas of erythema within the diverticulum and within the bladder.  These appear more inflammatory than neoplastic.  We will recheck a urine cytology.  In all likelihood gross hematuria is related to prostatic inflammation and vascularity along with his use of Eliquis.  Orders:    POCT urine dip auto non-scope    Cytology, urine    Elevated prostate specific antigen (PSA)  PI-RADS 4 lesion noted on MRI.  He is scheduled for fusion biopsy in August.           History of Present Illness   Anthony Nelson is a 85 y.o. male who presents to complete his evaluation for gross hematuria.  He is presently voiding well and gross hematuria has resolved.  He remains on combined medical therapy with tamsulosin and finasteride.  He notes he is scheduled for prostate biopsy next month secondary to elevated PSA and a PI-RADS 4 lesion on multiparametric MRI.  AUA SYMPTOM SCORE      Flowsheet Row Most Recent Value   AUA SYMPTOM SCORE    How often have you had a sensation of not emptying your bladder completely after you finished urinating? 1 (P)     How often have you had to urinate again less than two hours after you finished urinating? 1 (P)     How often have you found you stopped and started again several times when you urinate? 1 (P)     How often have you found it difficult to postpone urination? 1 (P)     How often have you had a weak urinary stream? 1 (P)     How often have you had to push or strain to begin urination? 0 (P)     How many times did you most typically get up to urinate from the time you went to bed at night until the time you got up in the morning? 1 (P)     Quality of Life: If you  were to spend the rest of your life with your urinary condition just the way it is now, how would you feel about that? 3 (P)     AUA SYMPTOM SCORE 6 (P)            Review of Systems   Constitutional:  Negative for chills, diaphoresis, fatigue and fever.   HENT: Negative.     Eyes: Negative.    Respiratory: Negative.     Cardiovascular: Negative.    Endocrine: Negative.    Genitourinary:         See HPI   Musculoskeletal: Negative.    Skin: Negative.    Allergic/Immunologic: Negative.    Neurological: Negative.    Hematological: Negative.    Psychiatric/Behavioral: Negative.            Objective   /72 (BP Location: Left arm, Patient Position: Sitting, Cuff Size: Standard)   Pulse 73   Ht 6' (1.829 m)   Wt 97.5 kg (215 lb)   SpO2 96%   BMI 29.16 kg/m²     Physical Exam  Constitutional:       General: He is not in acute distress.     Appearance: Normal appearance. He is well-developed and normal weight. He is not ill-appearing, toxic-appearing or diaphoretic.   HENT:      Head: Normocephalic and atraumatic.     Eyes:      General: No scleral icterus.     Conjunctiva/sclera: Conjunctivae normal.       Cardiovascular:      Rate and Rhythm: Normal rate.   Pulmonary:      Effort: Pulmonary effort is normal.   Abdominal:      General: Abdomen is flat. There is no distension.      Palpations: There is no mass.      Tenderness: There is no abdominal tenderness. There is no right CVA tenderness, left CVA tenderness, guarding or rebound.      Hernia: No hernia is present.   Genitourinary:     Penis: Normal.       Testes: Normal.     Musculoskeletal:      Cervical back: Neck supple.     Skin:     General: Skin is warm and dry.     Neurological:      Mental Status: He is alert and oriented to person, place, and time.     Psychiatric:         Behavior: Behavior normal.         Thought Content: Thought content normal.         Judgment: Judgment normal.           Cystoscopy     Date/Time  7/18/2025 1:30 PM     Performed  by  Johny Dubon MD   Authorized by  Johny Dubon MD       [facility] Protocol:  procedure performed by consultantConsent: Verbal consent obtained. Written consent obtained  Risks and benefits: risks, benefits and alternatives were discussed  Consent given by: patient  Patient understanding: patient states understanding of the procedure being performed  Patient consent: the patient's understanding of the procedure matches consent given  Procedure consent: procedure consent matches procedure scheduled  Required items: required blood products, implants, devices, and special equipment available  Patient identity confirmed: verbally with patient      Procedure Details:  Procedure type: cystoscopy    Patient tolerance: Patient tolerated the procedure well with no immediate complications    Additional Procedure Details:      Patient presents for cystoscopy.  I have discussed the reasons for doing the exam, and the potential risks and complications.  Patient expressed understanding, and signed informed consent document.    The patient was carefully  positioned supine on the examining table.  Sterile preparation was performed on the urethra.  Xylocaine jelly was instilled and left  Indwelling for the procedure.  The 15 Uzbek flexible cystoscope was passed with the following findings:      Urethra: Normal without stricture    Prostate:  lateral lobes-bilobar obstruction, no significant median lobe.    Prostate estimated at 40 to 60 g.  Prostate is vascular and friable.          Bladder: Moderate trabeculation, there is a large diverticulum.  Examination of the diverticulum does reveal areas of bladder erythema.  No papillary lesion.  No papillary lesion in the bladder.     Residual urine: Moderate    Patient tolerated the procedure well and was escorted from the examining table.     Results   Lab Results   Component Value Date    PSA 2.2 05/23/2025    PSA 10.84 (H) 05/22/2024     Lab Results   Component Value Date     CALCIUM 8.3 (L) 05/24/2024    K 4.8 05/23/2025    CO2 20 05/23/2025     05/23/2025    BUN 19 05/23/2025    CREATININE 1.32 (H) 05/23/2025     Lab Results   Component Value Date    WBC 7.13 05/24/2024    HGB 11.2 (L) 05/24/2024    HCT 34.2 (L) 05/24/2024    MCV 97 05/24/2024     05/24/2024       Office Urine Dip  Recent Results (from the past hour)   POCT urine dip auto non-scope    Collection Time: 07/18/25  1:37 PM   Result Value Ref Range     COLOR,UA Yellow     CLARITY,UA Clear     SPECIFIC GRAVITY,UA 1.010      PH,UA 6     LEUKOCYTE ESTERASE,UA Trace     NITRITE,UA Neg     GLUCOSE, UA Neg     KETONES,UA Neg     BILIRUBIN,UA Neg     BLOOD,UA Moderate     POCT URINE PROTEIN Trace     SL AMB POCT UROBILINOGEN 0.2

## 2025-07-23 ENCOUNTER — OFFICE VISIT (OUTPATIENT)
Dept: LAB | Facility: HOSPITAL | Age: 85
End: 2025-07-23
Payer: MEDICARE

## 2025-07-23 ENCOUNTER — APPOINTMENT (OUTPATIENT)
Dept: LAB | Facility: HOSPITAL | Age: 85
End: 2025-07-23
Payer: MEDICARE

## 2025-07-23 DIAGNOSIS — R97.20 ELEVATED PSA: ICD-10-CM

## 2025-07-23 DIAGNOSIS — Z01.810 PRE-OPERATIVE CARDIOVASCULAR EXAMINATION: ICD-10-CM

## 2025-07-23 LAB
ALBUMIN SERPL BCG-MCNC: 4.1 G/DL (ref 3.5–5)
ALP SERPL-CCNC: 45 U/L (ref 34–104)
ALT SERPL W P-5'-P-CCNC: 13 U/L (ref 7–52)
ANION GAP SERPL CALCULATED.3IONS-SCNC: 5 MMOL/L (ref 4–13)
AST SERPL W P-5'-P-CCNC: 19 U/L (ref 13–39)
BASOPHILS # BLD AUTO: 0.04 THOUSANDS/ÂΜL (ref 0–0.1)
BASOPHILS NFR BLD AUTO: 1 % (ref 0–1)
BILIRUB SERPL-MCNC: 0.41 MG/DL (ref 0.2–1)
BUN SERPL-MCNC: 17 MG/DL (ref 5–25)
CALCIUM SERPL-MCNC: 9 MG/DL (ref 8.4–10.2)
CHLORIDE SERPL-SCNC: 108 MMOL/L (ref 96–108)
CO2 SERPL-SCNC: 26 MMOL/L (ref 21–32)
CREAT SERPL-MCNC: 1.11 MG/DL (ref 0.6–1.3)
EOSINOPHIL # BLD AUTO: 0.22 THOUSAND/ÂΜL (ref 0–0.61)
EOSINOPHIL NFR BLD AUTO: 4 % (ref 0–6)
ERYTHROCYTE [DISTWIDTH] IN BLOOD BY AUTOMATED COUNT: 12.6 % (ref 11.6–15.1)
GFR SERPL CREATININE-BSD FRML MDRD: 60 ML/MIN/1.73SQ M
GLUCOSE P FAST SERPL-MCNC: 106 MG/DL (ref 65–99)
HCT VFR BLD AUTO: 34.4 % (ref 36.5–49.3)
HGB BLD-MCNC: 11.2 G/DL (ref 12–17)
IMM GRANULOCYTES # BLD AUTO: 0.01 THOUSAND/UL (ref 0–0.2)
IMM GRANULOCYTES NFR BLD AUTO: 0 % (ref 0–2)
LYMPHOCYTES # BLD AUTO: 0.87 THOUSANDS/ÂΜL (ref 0.6–4.47)
LYMPHOCYTES NFR BLD AUTO: 15 % (ref 14–44)
MCH RBC QN AUTO: 31.1 PG (ref 26.8–34.3)
MCHC RBC AUTO-ENTMCNC: 32.6 G/DL (ref 31.4–37.4)
MCV RBC AUTO: 96 FL (ref 82–98)
MONOCYTES # BLD AUTO: 0.67 THOUSAND/ÂΜL (ref 0.17–1.22)
MONOCYTES NFR BLD AUTO: 12 % (ref 4–12)
NEUTROPHILS # BLD AUTO: 3.97 THOUSANDS/ÂΜL (ref 1.85–7.62)
NEUTS SEG NFR BLD AUTO: 68 % (ref 43–75)
NRBC BLD AUTO-RTO: 0 /100 WBCS
PLATELET # BLD AUTO: 291 THOUSANDS/UL (ref 149–390)
PMV BLD AUTO: 8.6 FL (ref 8.9–12.7)
POTASSIUM SERPL-SCNC: 4.3 MMOL/L (ref 3.5–5.3)
PROT SERPL-MCNC: 6.9 G/DL (ref 6.4–8.4)
PSA SERPL-MCNC: 2.18 NG/ML (ref 0–4)
QRS AXIS: 85 DEGREES
QRSD INTERVAL: 122 MS
QT INTERVAL: 376 MS
QTC INTERVAL: 471 MS
RBC # BLD AUTO: 3.6 MILLION/UL (ref 3.88–5.62)
SODIUM SERPL-SCNC: 139 MMOL/L (ref 135–147)
T WAVE AXIS: -27 DEGREES
VENTRICULAR RATE: 94 BPM
WBC # BLD AUTO: 5.78 THOUSAND/UL (ref 4.31–10.16)

## 2025-07-23 PROCEDURE — 93005 ELECTROCARDIOGRAM TRACING: CPT

## 2025-07-23 PROCEDURE — 93010 ELECTROCARDIOGRAM REPORT: CPT | Performed by: INTERNAL MEDICINE

## 2025-07-24 LAB — BACTERIA UR CULT: NORMAL

## 2025-07-30 ENCOUNTER — TELEPHONE (OUTPATIENT)
Dept: CARDIOLOGY CLINIC | Facility: CLINIC | Age: 85
End: 2025-07-30

## 2025-07-31 ENCOUNTER — ANESTHESIA EVENT (OUTPATIENT)
Dept: PERIOP | Facility: HOSPITAL | Age: 85
End: 2025-07-31
Payer: MEDICARE

## 2025-08-11 ENCOUNTER — HOSPITAL ENCOUNTER (OUTPATIENT)
Facility: HOSPITAL | Age: 85
Setting detail: OUTPATIENT SURGERY
Discharge: HOME | End: 2025-08-11
Attending: UROLOGY | Admitting: UROLOGY
Payer: MEDICARE

## 2025-08-11 ENCOUNTER — ANESTHESIA (OUTPATIENT)
Dept: PERIOP | Facility: HOSPITAL | Age: 85
End: 2025-08-11
Payer: MEDICARE